# Patient Record
Sex: FEMALE | Race: WHITE | NOT HISPANIC OR LATINO | URBAN - METROPOLITAN AREA
[De-identification: names, ages, dates, MRNs, and addresses within clinical notes are randomized per-mention and may not be internally consistent; named-entity substitution may affect disease eponyms.]

---

## 2017-03-31 ENCOUNTER — OUTPATIENT (OUTPATIENT)
Dept: OUTPATIENT SERVICES | Facility: HOSPITAL | Age: 37
LOS: 1 days | End: 2017-03-31
Payer: MEDICAID

## 2017-03-31 DIAGNOSIS — O26.899 OTHER SPECIFIED PREGNANCY RELATED CONDITIONS, UNSPECIFIED TRIMESTER: ICD-10-CM

## 2017-03-31 DIAGNOSIS — Z3A.00 WEEKS OF GESTATION OF PREGNANCY NOT SPECIFIED: ICD-10-CM

## 2017-03-31 LAB
APPEARANCE UR: SIGNIFICANT CHANGE UP
BILIRUB UR-MCNC: NEGATIVE — SIGNIFICANT CHANGE UP
BLOOD UR QL VISUAL: NEGATIVE — SIGNIFICANT CHANGE UP
COLOR SPEC: SIGNIFICANT CHANGE UP
GLUCOSE UR-MCNC: NEGATIVE — SIGNIFICANT CHANGE UP
KETONES UR-MCNC: SIGNIFICANT CHANGE UP
LEUKOCYTE ESTERASE UR-ACNC: NEGATIVE — SIGNIFICANT CHANGE UP
MUCOUS THREADS # UR AUTO: SIGNIFICANT CHANGE UP
NITRITE UR-MCNC: NEGATIVE — SIGNIFICANT CHANGE UP
PH UR: 6.5 — SIGNIFICANT CHANGE UP (ref 4.6–8)
PROT UR-MCNC: 30 — HIGH
RBC CASTS # UR COMP ASSIST: SIGNIFICANT CHANGE UP (ref 0–?)
SP GR SPEC: 1.01 — SIGNIFICANT CHANGE UP (ref 1–1.03)
SQUAMOUS # UR AUTO: SIGNIFICANT CHANGE UP
UROBILINOGEN FLD QL: NORMAL E.U. — SIGNIFICANT CHANGE UP (ref 0.1–0.2)

## 2017-03-31 PROCEDURE — 76815 OB US LIMITED FETUS(S): CPT | Mod: 26

## 2017-03-31 PROCEDURE — 76817 TRANSVAGINAL US OBSTETRIC: CPT | Mod: 26

## 2017-03-31 PROCEDURE — 99213 OFFICE O/P EST LOW 20 MIN: CPT | Mod: 25

## 2017-03-31 RX ORDER — PROGESTERONE 200 MG/1
1 CAPSULE, LIQUID FILLED ORAL
Qty: 7 | Refills: 0 | OUTPATIENT
Start: 2017-03-31 | End: 2017-04-07

## 2017-04-02 RX ORDER — PROGESTERONE 200 MG/1
1 CAPSULE, LIQUID FILLED ORAL
Qty: 7 | Refills: 0 | OUTPATIENT
Start: 2017-04-02 | End: 2017-04-09

## 2017-04-02 RX ORDER — PROGESTERONE 200 MG/1
1 CAPSULE, LIQUID FILLED ORAL
Qty: 30 | Refills: 0 | OUTPATIENT
Start: 2017-04-02 | End: 2017-05-02

## 2017-04-03 ENCOUNTER — INPATIENT (INPATIENT)
Facility: HOSPITAL | Age: 37
LOS: 0 days | Discharge: HOME | End: 2017-04-04
Attending: OBSTETRICS & GYNECOLOGY | Admitting: OBSTETRICS & GYNECOLOGY
Payer: MEDICAID

## 2017-04-03 VITALS — WEIGHT: 185.19 LBS | HEIGHT: 66 IN

## 2017-04-03 DIAGNOSIS — Z3A.00 WEEKS OF GESTATION OF PREGNANCY NOT SPECIFIED: ICD-10-CM

## 2017-04-03 DIAGNOSIS — O26.899 OTHER SPECIFIED PREGNANCY RELATED CONDITIONS, UNSPECIFIED TRIMESTER: ICD-10-CM

## 2017-04-03 LAB
APPEARANCE UR: CLEAR — SIGNIFICANT CHANGE UP
BACTERIA # UR AUTO: SIGNIFICANT CHANGE UP
BILIRUB UR-MCNC: NEGATIVE — SIGNIFICANT CHANGE UP
BLD GP AB SCN SERPL QL: NEGATIVE — SIGNIFICANT CHANGE UP
BLOOD UR QL VISUAL: NEGATIVE — SIGNIFICANT CHANGE UP
COLOR SPEC: SIGNIFICANT CHANGE UP
GLUCOSE UR-MCNC: 50 — SIGNIFICANT CHANGE UP
HCT VFR BLD CALC: 37.7 % — SIGNIFICANT CHANGE UP (ref 34.5–45)
HGB BLD-MCNC: 12.9 G/DL — SIGNIFICANT CHANGE UP (ref 11.5–15.5)
HYALINE CASTS # UR AUTO: SIGNIFICANT CHANGE UP (ref 0–?)
KETONES UR-MCNC: NEGATIVE — SIGNIFICANT CHANGE UP
LEUKOCYTE ESTERASE UR-ACNC: NEGATIVE — SIGNIFICANT CHANGE UP
MCHC RBC-ENTMCNC: 32.7 PG — SIGNIFICANT CHANGE UP (ref 27–34)
MCHC RBC-ENTMCNC: 34.2 % — SIGNIFICANT CHANGE UP (ref 32–36)
MCV RBC AUTO: 95.7 FL — SIGNIFICANT CHANGE UP (ref 80–100)
MUCOUS THREADS # UR AUTO: SIGNIFICANT CHANGE UP
NITRITE UR-MCNC: NEGATIVE — SIGNIFICANT CHANGE UP
NON-SQ EPI CELLS # UR AUTO: <1 — SIGNIFICANT CHANGE UP
PH UR: 6.5 — SIGNIFICANT CHANGE UP (ref 4.6–8)
PLATELET # BLD AUTO: 322 K/UL — SIGNIFICANT CHANGE UP (ref 150–400)
PMV BLD: 9.9 FL — SIGNIFICANT CHANGE UP (ref 7–13)
PROT UR-MCNC: NEGATIVE — SIGNIFICANT CHANGE UP
RBC # BLD: 3.94 M/UL — SIGNIFICANT CHANGE UP (ref 3.8–5.2)
RBC # FLD: 13.3 % — SIGNIFICANT CHANGE UP (ref 10.3–14.5)
RH IG SCN BLD-IMP: POSITIVE — SIGNIFICANT CHANGE UP
RH IG SCN BLD-IMP: POSITIVE — SIGNIFICANT CHANGE UP
SP GR SPEC: 1.01 — SIGNIFICANT CHANGE UP (ref 1–1.03)
SQUAMOUS # UR AUTO: SIGNIFICANT CHANGE UP
UROBILINOGEN FLD QL: NORMAL E.U. — SIGNIFICANT CHANGE UP (ref 0.1–0.2)
WBC # BLD: 8.83 K/UL — SIGNIFICANT CHANGE UP (ref 3.8–10.5)
WBC # FLD AUTO: 8.83 K/UL — SIGNIFICANT CHANGE UP (ref 3.8–10.5)
WBC UR QL: SIGNIFICANT CHANGE UP (ref 0–?)

## 2017-04-03 RX ORDER — DIPHENHYDRAMINE HCL 50 MG
25 CAPSULE ORAL ONCE
Qty: 0 | Refills: 0 | Status: COMPLETED | OUTPATIENT
Start: 2017-04-03 | End: 2017-04-03

## 2017-04-03 RX ORDER — SODIUM CHLORIDE 9 MG/ML
1000 INJECTION, SOLUTION INTRAVENOUS
Qty: 0 | Refills: 0 | Status: DISCONTINUED | OUTPATIENT
Start: 2017-04-03 | End: 2017-04-04

## 2017-04-03 RX ADMIN — Medication 25 MILLIGRAM(S): at 22:58

## 2017-04-04 ENCOUNTER — ASOB RESULT (OUTPATIENT)
Age: 37
End: 2017-04-04

## 2017-04-04 ENCOUNTER — OUTPATIENT (OUTPATIENT)
Dept: OUTPATIENT SERVICES | Facility: HOSPITAL | Age: 37
LOS: 1 days | End: 2017-04-04
Payer: MEDICAID

## 2017-04-04 ENCOUNTER — APPOINTMENT (OUTPATIENT)
Dept: ANTEPARTUM | Facility: HOSPITAL | Age: 37
End: 2017-04-04

## 2017-04-04 VITALS
HEART RATE: 89 BPM | SYSTOLIC BLOOD PRESSURE: 108 MMHG | RESPIRATION RATE: 15 BRPM | DIASTOLIC BLOOD PRESSURE: 59 MMHG | TEMPERATURE: 98 F | OXYGEN SATURATION: 100 %

## 2017-04-04 DIAGNOSIS — O34.30 MATERNAL CARE FOR CERVICAL INCOMPETENCE, UNSPECIFIED TRIMESTER: ICD-10-CM

## 2017-04-04 DIAGNOSIS — O26.899 OTHER SPECIFIED PREGNANCY RELATED CONDITIONS, UNSPECIFIED TRIMESTER: ICD-10-CM

## 2017-04-04 PROBLEM — Z00.00 ENCOUNTER FOR PREVENTIVE HEALTH EXAMINATION: Status: ACTIVE | Noted: 2017-04-04

## 2017-04-04 LAB
BODY FLUID TYPE: SIGNIFICANT CHANGE UP
CLARITY SPEC: CLEAR — SIGNIFICANT CHANGE UP
COLOR FLD: SIGNIFICANT CHANGE UP
CRYSTALS FLD MICRO: SIGNIFICANT CHANGE UP
GLUCOSE FLD-MCNC: 32 MG/DL — SIGNIFICANT CHANGE UP
GRAM STN FLD: SIGNIFICANT CHANGE UP
GRAM STN FLD: SIGNIFICANT CHANGE UP
LYMPHOCYTES NFR FLD: 40 % — SIGNIFICANT CHANGE UP
RCV VOL RI: 18 CELL/UL — HIGH (ref 0–5)
SPECIMEN SOURCE: SIGNIFICANT CHANGE UP
SPECIMEN SOURCE: SIGNIFICANT CHANGE UP
TOTAL CELLS COUNTED, BODY FLUID: 50 CELLS — SIGNIFICANT CHANGE UP
TOTAL NUCLEATED CELL COUNT, BODY FLUID: 6 CELL/UL — HIGH (ref 0–5)

## 2017-04-04 PROCEDURE — 59320 REVISION OF CERVIX: CPT | Mod: GC

## 2017-04-04 PROCEDURE — 88291 CYTO/MOLECULAR REPORT: CPT

## 2017-04-04 RX ORDER — METOCLOPRAMIDE HCL 10 MG
10 TABLET ORAL ONCE
Qty: 0 | Refills: 0 | Status: COMPLETED | OUTPATIENT
Start: 2017-04-04 | End: 2017-04-04

## 2017-04-04 RX ORDER — SIMETHICONE 80 MG/1
80 TABLET, CHEWABLE ORAL EVERY 4 HOURS
Qty: 0 | Refills: 0 | Status: DISCONTINUED | OUTPATIENT
Start: 2017-04-04 | End: 2017-04-04

## 2017-04-04 RX ORDER — DIPHENHYDRAMINE HCL 50 MG
25 CAPSULE ORAL EVERY 6 HOURS
Qty: 0 | Refills: 0 | Status: DISCONTINUED | OUTPATIENT
Start: 2017-04-04 | End: 2017-04-04

## 2017-04-04 RX ORDER — INDOMETHACIN 50 MG
25 CAPSULE ORAL ONCE
Qty: 0 | Refills: 0 | Status: COMPLETED | OUTPATIENT
Start: 2017-04-04 | End: 2017-04-04

## 2017-04-04 RX ORDER — FERROUS SULFATE 325(65) MG
325 TABLET ORAL DAILY
Qty: 0 | Refills: 0 | Status: DISCONTINUED | OUTPATIENT
Start: 2017-04-04 | End: 2017-04-04

## 2017-04-04 RX ORDER — GLYCERIN ADULT
1 SUPPOSITORY, RECTAL RECTAL AT BEDTIME
Qty: 0 | Refills: 0 | Status: DISCONTINUED | OUTPATIENT
Start: 2017-04-04 | End: 2017-04-04

## 2017-04-04 RX ORDER — FAMOTIDINE 10 MG/ML
20 INJECTION INTRAVENOUS ONCE
Qty: 0 | Refills: 0 | Status: COMPLETED | OUTPATIENT
Start: 2017-04-04 | End: 2017-04-04

## 2017-04-04 RX ORDER — TETANUS TOXOID, REDUCED DIPHTHERIA TOXOID AND ACELLULAR PERTUSSIS VACCINE, ADSORBED 5; 2.5; 8; 8; 2.5 [IU]/.5ML; [IU]/.5ML; UG/.5ML; UG/.5ML; UG/.5ML
0.5 SUSPENSION INTRAMUSCULAR ONCE
Qty: 0 | Refills: 0 | Status: DISCONTINUED | OUTPATIENT
Start: 2017-04-04 | End: 2017-04-04

## 2017-04-04 RX ORDER — DOCUSATE SODIUM 100 MG
100 CAPSULE ORAL
Qty: 0 | Refills: 0 | Status: DISCONTINUED | OUTPATIENT
Start: 2017-04-04 | End: 2017-04-04

## 2017-04-04 RX ORDER — ACETAMINOPHEN 500 MG
975 TABLET ORAL EVERY 6 HOURS
Qty: 0 | Refills: 0 | Status: DISCONTINUED | OUTPATIENT
Start: 2017-04-04 | End: 2017-04-04

## 2017-04-04 RX ORDER — CITRIC ACID/SODIUM CITRATE 300-500 MG
30 SOLUTION, ORAL ORAL ONCE
Qty: 0 | Refills: 0 | Status: COMPLETED | OUTPATIENT
Start: 2017-04-04 | End: 2017-04-04

## 2017-04-04 RX ORDER — INDOMETHACIN 50 MG
50 CAPSULE ORAL ONCE
Qty: 0 | Refills: 0 | Status: COMPLETED | OUTPATIENT
Start: 2017-04-04 | End: 2017-04-04

## 2017-04-04 RX ORDER — INDOMETHACIN 50 MG
1 CAPSULE ORAL
Qty: 8 | Refills: 0 | OUTPATIENT
Start: 2017-04-04 | End: 2017-04-06

## 2017-04-04 RX ORDER — OXYTOCIN 10 UNIT/ML
41.67 VIAL (ML) INJECTION
Qty: 20 | Refills: 0 | Status: DISCONTINUED | OUTPATIENT
Start: 2017-04-04 | End: 2017-04-04

## 2017-04-04 RX ORDER — LANOLIN
1 OINTMENT (GRAM) TOPICAL
Qty: 0 | Refills: 0 | Status: DISCONTINUED | OUTPATIENT
Start: 2017-04-04 | End: 2017-04-04

## 2017-04-04 RX ORDER — SODIUM CHLORIDE 9 MG/ML
1000 INJECTION, SOLUTION INTRAVENOUS
Qty: 0 | Refills: 0 | Status: DISCONTINUED | OUTPATIENT
Start: 2017-04-04 | End: 2017-04-04

## 2017-04-04 RX ADMIN — Medication 25 MILLIGRAM(S): at 19:33

## 2017-04-04 RX ADMIN — Medication 10 MILLIGRAM(S): at 13:07

## 2017-04-04 RX ADMIN — SODIUM CHLORIDE 125 MILLILITER(S): 9 INJECTION, SOLUTION INTRAVENOUS at 10:47

## 2017-04-04 RX ADMIN — FAMOTIDINE 20 MILLIGRAM(S): 10 INJECTION INTRAVENOUS at 13:07

## 2017-04-04 RX ADMIN — Medication 50 MILLIGRAM(S): at 13:08

## 2017-04-04 RX ADMIN — SODIUM CHLORIDE 125 MILLILITER(S): 9 INJECTION, SOLUTION INTRAVENOUS at 02:39

## 2017-04-04 RX ADMIN — Medication 30 MILLILITER(S): at 13:07

## 2017-04-04 NOTE — DISCHARGE NOTE ANTEPARTUM - PLAN OF CARE
recover resume regular diet, pelvic rest. limit activities. continue indocin x24hrs. follow up w/ dr. french in office this week.

## 2017-04-04 NOTE — DISCHARGE NOTE ANTEPARTUM - PATIENT PORTAL LINK FT
“You can access the FollowHealth Patient Portal, offered by [facility], by registering with the following website: [email or web address]”

## 2017-04-04 NOTE — DISCHARGE NOTE ANTEPARTUM - MEDICATION SUMMARY - MEDICATIONS TO TAKE
I will START or STAY ON the medications listed below when I get home from the hospital:    indomethacin 25 mg oral capsule  -- 1 cap(s) by mouth every 6 hours MDD:4  -- Do not take aspirin or aspirin containing products without knowledge and consent of your physician.  It is very important that you take or use this exactly as directed.  Do not skip doses or discontinue unless directed by your doctor.  May cause drowsiness or dizziness.  Obtain medical advice before taking any non-prescription drugs as some may affect the action of this medication.  Take with food or milk.    -- Indication: For contractions I will START or STAY ON the medications listed below when I get home from the hospital:    indomethacin 25 mg oral capsule  -- 1 cap(s) by mouth every 6 hours MDD:4  -- Do not take aspirin or aspirin containing products without knowledge and consent of your physician.  It is very important that you take or use this exactly as directed.  Do not skip doses or discontinue unless directed by your doctor.  May cause drowsiness or dizziness.  Obtain medical advice before taking any non-prescription drugs as some may affect the action of this medication.  Take with food or milk.    -- Indication: For pain

## 2017-04-04 NOTE — DISCHARGE NOTE ANTEPARTUM - CARE PLAN
Principal Discharge DX:	Cervical insufficiency during pregnancy in second trimester, antepartum  Goal:	recover  Instructions for follow-up, activity and diet:	resume regular diet, pelvic rest. limit activities. continue indocin x24hrs. follow up w/ dr. french in office this week.

## 2017-04-04 NOTE — DISCHARGE NOTE ANTEPARTUM - CARE PROVIDER_API CALL
Lisette Slade), Gynecology Obstetrics  Gynecology Obstetrics and Gynecology  [address]  [place] [address]  Phone: [phone]  Fax: [phone]

## 2017-04-04 NOTE — PROVIDER CONTACT NOTE (OTHER) - RECOMMENDATIONS
Review necessity of Heparin administration <12 hours prior to scheduled  with MD.    Utilize venodynes while pt in bed.

## 2017-04-04 NOTE — DISCHARGE NOTE ANTEPARTUM - HOSPITAL COURSE
36  @19w3d presents with 1cm open cervix with membranes funneling. Pt had an amniocentesis,which was negative for infection. pt underwent a rescue cerclage. pt was discharged hd#2 in stable condition.

## 2017-04-04 NOTE — PROVIDER CONTACT NOTE (OTHER) - SITUATION
Pt is 34 weeks on 17  pt is G 2 P 1001 due to deliver twins on 17 by scheduled    at 0730.  Pt due for Heparin 5,000 units at 2200 on 4/3/17. Pt is 34 weeks on 17  pt is G 2 P 1001 due to deliver twins on 17 by scheduled    at 0730.  Pt due for Heparin 5,000 units SQ at 2200 on 4/3/17.

## 2017-04-05 ENCOUNTER — TRANSCRIPTION ENCOUNTER (OUTPATIENT)
Age: 37
End: 2017-04-05

## 2017-04-05 DIAGNOSIS — Z3A.19 19 WEEKS GESTATION OF PREGNANCY: ICD-10-CM

## 2017-04-05 DIAGNOSIS — O26.872 CERVICAL SHORTENING, SECOND TRIMESTER: ICD-10-CM

## 2017-04-05 DIAGNOSIS — O09.522 SUPERVISION OF ELDERLY MULTIGRAVIDA, SECOND TRIMESTER: ICD-10-CM

## 2017-04-05 DIAGNOSIS — Z36 ENCOUNTER FOR ANTENATAL SCREENING OF MOTHER: ICD-10-CM

## 2017-04-05 LAB
BACTERIA UR CULT: SIGNIFICANT CHANGE UP
SPECIMEN SOURCE: SIGNIFICANT CHANGE UP

## 2017-04-06 LAB — GP B STREP GENITAL QL CULT: SIGNIFICANT CHANGE UP

## 2017-04-09 LAB
BACTERIA FLD CULT: SIGNIFICANT CHANGE UP
BACTERIA WND CULT: SIGNIFICANT CHANGE UP

## 2017-04-12 LAB — CHROM ANALY OVERALL INTERP SPEC-IMP: SIGNIFICANT CHANGE UP

## 2017-06-19 ENCOUNTER — TRANSCRIPTION ENCOUNTER (OUTPATIENT)
Age: 37
End: 2017-06-19

## 2017-06-19 ENCOUNTER — RESULT REVIEW (OUTPATIENT)
Age: 37
End: 2017-06-19

## 2017-06-19 ENCOUNTER — INPATIENT (INPATIENT)
Facility: HOSPITAL | Age: 37
LOS: 2 days | Discharge: HOME | End: 2017-06-22
Attending: OBSTETRICS & GYNECOLOGY | Admitting: OBSTETRICS & GYNECOLOGY
Payer: MEDICAID

## 2017-06-19 VITALS — HEIGHT: 66 IN | WEIGHT: 194.01 LBS

## 2017-06-19 DIAGNOSIS — O26.899 OTHER SPECIFIED PREGNANCY RELATED CONDITIONS, UNSPECIFIED TRIMESTER: ICD-10-CM

## 2017-06-19 DIAGNOSIS — Z3A.00 WEEKS OF GESTATION OF PREGNANCY NOT SPECIFIED: ICD-10-CM

## 2017-06-19 LAB
BASOPHILS # BLD AUTO: 0.02 K/UL — SIGNIFICANT CHANGE UP (ref 0–0.2)
BASOPHILS NFR BLD AUTO: 0.2 % — SIGNIFICANT CHANGE UP (ref 0–2)
BLD GP AB SCN SERPL QL: NEGATIVE — SIGNIFICANT CHANGE UP
EOSINOPHIL # BLD AUTO: 0.16 K/UL — SIGNIFICANT CHANGE UP (ref 0–0.5)
EOSINOPHIL NFR BLD AUTO: 1.6 % — SIGNIFICANT CHANGE UP (ref 0–6)
HCT VFR BLD CALC: 37.5 % — SIGNIFICANT CHANGE UP (ref 34.5–45)
HGB BLD-MCNC: 12.3 G/DL — SIGNIFICANT CHANGE UP (ref 11.5–15.5)
IMM GRANULOCYTES NFR BLD AUTO: 0.7 % — SIGNIFICANT CHANGE UP (ref 0–1.5)
LYMPHOCYTES # BLD AUTO: 1.62 K/UL — SIGNIFICANT CHANGE UP (ref 1–3.3)
LYMPHOCYTES # BLD AUTO: 16.2 % — SIGNIFICANT CHANGE UP (ref 13–44)
MCHC RBC-ENTMCNC: 31.1 PG — SIGNIFICANT CHANGE UP (ref 27–34)
MCHC RBC-ENTMCNC: 32.8 % — SIGNIFICANT CHANGE UP (ref 32–36)
MCV RBC AUTO: 94.9 FL — SIGNIFICANT CHANGE UP (ref 80–100)
MONOCYTES # BLD AUTO: 0.72 K/UL — SIGNIFICANT CHANGE UP (ref 0–0.9)
MONOCYTES NFR BLD AUTO: 7.2 % — SIGNIFICANT CHANGE UP (ref 2–14)
NEUTROPHILS # BLD AUTO: 7.43 K/UL — HIGH (ref 1.8–7.4)
NEUTROPHILS NFR BLD AUTO: 74.1 % — SIGNIFICANT CHANGE UP (ref 43–77)
PLATELET # BLD AUTO: 400 K/UL — SIGNIFICANT CHANGE UP (ref 150–400)
PMV BLD: 9.8 FL — SIGNIFICANT CHANGE UP (ref 7–13)
RBC # BLD: 3.95 M/UL — SIGNIFICANT CHANGE UP (ref 3.8–5.2)
RBC # FLD: 13 % — SIGNIFICANT CHANGE UP (ref 10.3–14.5)
RH IG SCN BLD-IMP: POSITIVE — SIGNIFICANT CHANGE UP
WBC # BLD: 10.02 K/UL — SIGNIFICANT CHANGE UP (ref 3.8–10.5)
WBC # FLD AUTO: 10.02 K/UL — SIGNIFICANT CHANGE UP (ref 3.8–10.5)

## 2017-06-19 PROCEDURE — 88307 TISSUE EXAM BY PATHOLOGIST: CPT | Mod: 26

## 2017-06-19 PROCEDURE — 88312 SPECIAL STAINS GROUP 1: CPT | Mod: 26

## 2017-06-19 RX ORDER — MAGNESIUM SULFATE 500 MG/ML
2.5 VIAL (ML) INJECTION
Qty: 40 | Refills: 0 | Status: DISCONTINUED | OUTPATIENT
Start: 2017-06-19 | End: 2017-06-19

## 2017-06-19 RX ORDER — CITRIC ACID/SODIUM CITRATE 300-500 MG
30 SOLUTION, ORAL ORAL ONCE
Qty: 0 | Refills: 0 | Status: COMPLETED | OUTPATIENT
Start: 2017-06-19 | End: 2017-06-19

## 2017-06-19 RX ORDER — MAGNESIUM SULFATE 500 MG/ML
2 VIAL (ML) INJECTION
Qty: 40 | Refills: 0 | Status: DISCONTINUED | OUTPATIENT
Start: 2017-06-19 | End: 2017-06-19

## 2017-06-19 RX ORDER — SODIUM CHLORIDE 9 MG/ML
1000 INJECTION, SOLUTION INTRAVENOUS
Qty: 0 | Refills: 0 | Status: DISCONTINUED | OUTPATIENT
Start: 2017-06-20 | End: 2017-06-22

## 2017-06-19 RX ORDER — AMPICILLIN TRIHYDRATE 250 MG
CAPSULE ORAL EVERY 6 HOURS
Qty: 0 | Refills: 0 | Status: DISCONTINUED | OUTPATIENT
Start: 2017-06-19 | End: 2017-06-19

## 2017-06-19 RX ORDER — SODIUM CHLORIDE 9 MG/ML
1000 INJECTION, SOLUTION INTRAVENOUS
Qty: 0 | Refills: 0 | Status: DISCONTINUED | OUTPATIENT
Start: 2017-06-19 | End: 2017-06-20

## 2017-06-19 RX ORDER — DIPHENHYDRAMINE HCL 50 MG
25 CAPSULE ORAL EVERY 6 HOURS
Qty: 0 | Refills: 0 | Status: DISCONTINUED | OUTPATIENT
Start: 2017-06-20 | End: 2017-06-22

## 2017-06-19 RX ORDER — OXYCODONE HYDROCHLORIDE 5 MG/1
5 TABLET ORAL EVERY 4 HOURS
Qty: 0 | Refills: 0 | Status: DISCONTINUED | OUTPATIENT
Start: 2017-06-20 | End: 2017-06-22

## 2017-06-19 RX ORDER — FERROUS SULFATE 325(65) MG
325 TABLET ORAL DAILY
Qty: 0 | Refills: 0 | Status: DISCONTINUED | OUTPATIENT
Start: 2017-06-20 | End: 2017-06-22

## 2017-06-19 RX ORDER — MAGNESIUM SULFATE 500 MG/ML
4 VIAL (ML) INJECTION ONCE
Qty: 0 | Refills: 0 | Status: COMPLETED | OUTPATIENT
Start: 2017-06-19 | End: 2017-06-19

## 2017-06-19 RX ORDER — ERYTHROMYCIN ETHYLSUCCINATE 400 MG
250 TABLET ORAL EVERY 8 HOURS
Qty: 0 | Refills: 0 | Status: CANCELLED | OUTPATIENT
Start: 2017-06-21 | End: 2017-06-19

## 2017-06-19 RX ORDER — IBUPROFEN 200 MG
600 TABLET ORAL EVERY 6 HOURS
Qty: 0 | Refills: 0 | Status: COMPLETED | OUTPATIENT
Start: 2017-06-20 | End: 2018-05-19

## 2017-06-19 RX ORDER — ONDANSETRON 8 MG/1
4 TABLET, FILM COATED ORAL EVERY 6 HOURS
Qty: 0 | Refills: 0 | Status: DISCONTINUED | OUTPATIENT
Start: 2017-06-20 | End: 2017-06-22

## 2017-06-19 RX ORDER — AMPICILLIN TRIHYDRATE 250 MG
2 CAPSULE ORAL EVERY 6 HOURS
Qty: 0 | Refills: 0 | Status: CANCELLED | OUTPATIENT
Start: 2017-06-20 | End: 2017-06-19

## 2017-06-19 RX ORDER — OXYTOCIN 10 UNIT/ML
41.67 VIAL (ML) INJECTION
Qty: 20 | Refills: 0 | Status: DISCONTINUED | OUTPATIENT
Start: 2017-06-20 | End: 2017-06-22

## 2017-06-19 RX ORDER — ERYTHROMYCIN ETHYLSUCCINATE 400 MG
TABLET ORAL EVERY 6 HOURS
Qty: 0 | Refills: 0 | Status: DISCONTINUED | OUTPATIENT
Start: 2017-06-19 | End: 2017-06-19

## 2017-06-19 RX ORDER — AMOXICILLIN 250 MG/5ML
500 SUSPENSION, RECONSTITUTED, ORAL (ML) ORAL EVERY 8 HOURS
Qty: 0 | Refills: 0 | Status: CANCELLED | OUTPATIENT
Start: 2017-06-21 | End: 2017-06-19

## 2017-06-19 RX ORDER — OXYCODONE HYDROCHLORIDE 5 MG/1
5 TABLET ORAL
Qty: 0 | Refills: 0 | Status: DISCONTINUED | OUTPATIENT
Start: 2017-06-20 | End: 2017-06-22

## 2017-06-19 RX ORDER — SODIUM CHLORIDE 9 MG/ML
1000 INJECTION, SOLUTION INTRAVENOUS
Qty: 0 | Refills: 0 | Status: DISCONTINUED | OUTPATIENT
Start: 2017-06-19 | End: 2017-06-19

## 2017-06-19 RX ORDER — KETOROLAC TROMETHAMINE 30 MG/ML
30 SYRINGE (ML) INJECTION EVERY 6 HOURS
Qty: 0 | Refills: 0 | Status: DISCONTINUED | OUTPATIENT
Start: 2017-06-20 | End: 2017-06-22

## 2017-06-19 RX ORDER — AMPICILLIN TRIHYDRATE 250 MG
2 CAPSULE ORAL ONCE
Qty: 0 | Refills: 0 | Status: COMPLETED | OUTPATIENT
Start: 2017-06-19 | End: 2017-06-19

## 2017-06-19 RX ORDER — MORPHINE SULFATE 50 MG/1
0.2 CAPSULE, EXTENDED RELEASE ORAL ONCE
Qty: 0 | Refills: 0 | Status: DISCONTINUED | OUTPATIENT
Start: 2017-06-20 | End: 2017-06-22

## 2017-06-19 RX ORDER — TETANUS TOXOID, REDUCED DIPHTHERIA TOXOID AND ACELLULAR PERTUSSIS VACCINE, ADSORBED 5; 2.5; 8; 8; 2.5 [IU]/.5ML; [IU]/.5ML; UG/.5ML; UG/.5ML; UG/.5ML
0.5 SUSPENSION INTRAMUSCULAR ONCE
Qty: 0 | Refills: 0 | Status: DISCONTINUED | OUTPATIENT
Start: 2017-06-20 | End: 2017-06-22

## 2017-06-19 RX ORDER — ERYTHROMYCIN ETHYLSUCCINATE 400 MG
250 TABLET ORAL EVERY 6 HOURS
Qty: 0 | Refills: 0 | Status: CANCELLED | OUTPATIENT
Start: 2017-06-20 | End: 2017-06-19

## 2017-06-19 RX ORDER — SIMETHICONE 80 MG/1
80 TABLET, CHEWABLE ORAL EVERY 4 HOURS
Qty: 0 | Refills: 0 | Status: DISCONTINUED | OUTPATIENT
Start: 2017-06-20 | End: 2017-06-22

## 2017-06-19 RX ORDER — NALOXONE HYDROCHLORIDE 4 MG/.1ML
0.1 SPRAY NASAL
Qty: 0 | Refills: 0 | Status: DISCONTINUED | OUTPATIENT
Start: 2017-06-20 | End: 2017-06-22

## 2017-06-19 RX ORDER — LANOLIN
1 OINTMENT (GRAM) TOPICAL
Qty: 0 | Refills: 0 | Status: DISCONTINUED | OUTPATIENT
Start: 2017-06-20 | End: 2017-06-22

## 2017-06-19 RX ORDER — MORPHINE SULFATE 50 MG/1
2 CAPSULE, EXTENDED RELEASE ORAL ONCE
Qty: 0 | Refills: 0 | Status: DISCONTINUED | OUTPATIENT
Start: 2017-06-19 | End: 2017-06-19

## 2017-06-19 RX ORDER — ERYTHROMYCIN ETHYLSUCCINATE 400 MG
250 TABLET ORAL ONCE
Qty: 0 | Refills: 0 | Status: COMPLETED | OUTPATIENT
Start: 2017-06-19 | End: 2017-06-19

## 2017-06-19 RX ORDER — METOCLOPRAMIDE HCL 10 MG
10 TABLET ORAL ONCE
Qty: 0 | Refills: 0 | Status: COMPLETED | OUTPATIENT
Start: 2017-06-19 | End: 2017-06-19

## 2017-06-19 RX ORDER — ACETAMINOPHEN 500 MG
975 TABLET ORAL EVERY 6 HOURS
Qty: 0 | Refills: 0 | Status: COMPLETED | OUTPATIENT
Start: 2017-06-20 | End: 2018-05-19

## 2017-06-19 RX ORDER — OXYCODONE HYDROCHLORIDE 5 MG/1
10 TABLET ORAL
Qty: 0 | Refills: 0 | Status: DISCONTINUED | OUTPATIENT
Start: 2017-06-20 | End: 2017-06-22

## 2017-06-19 RX ORDER — GLYCERIN ADULT
1 SUPPOSITORY, RECTAL RECTAL AT BEDTIME
Qty: 0 | Refills: 0 | Status: DISCONTINUED | OUTPATIENT
Start: 2017-06-20 | End: 2017-06-22

## 2017-06-19 RX ORDER — HYDROMORPHONE HYDROCHLORIDE 2 MG/ML
1 INJECTION INTRAMUSCULAR; INTRAVENOUS; SUBCUTANEOUS
Qty: 0 | Refills: 0 | Status: DISCONTINUED | OUTPATIENT
Start: 2017-06-20 | End: 2017-06-20

## 2017-06-19 RX ORDER — DOCUSATE SODIUM 100 MG
100 CAPSULE ORAL
Qty: 0 | Refills: 0 | Status: DISCONTINUED | OUTPATIENT
Start: 2017-06-20 | End: 2017-06-22

## 2017-06-19 RX ORDER — FAMOTIDINE 10 MG/ML
20 INJECTION INTRAVENOUS ONCE
Qty: 0 | Refills: 0 | Status: COMPLETED | OUTPATIENT
Start: 2017-06-19 | End: 2017-06-19

## 2017-06-19 RX ORDER — OXYTOCIN 10 UNIT/ML
333.33 VIAL (ML) INJECTION
Qty: 20 | Refills: 0 | Status: DISCONTINUED | OUTPATIENT
Start: 2017-06-19 | End: 2017-06-22

## 2017-06-19 RX ORDER — OXYTOCIN 10 UNIT/ML
41.67 VIAL (ML) INJECTION
Qty: 20 | Refills: 0 | Status: DISCONTINUED | OUTPATIENT
Start: 2017-06-19 | End: 2017-06-22

## 2017-06-19 RX ADMIN — Medication 300 GRAM(S): at 16:57

## 2017-06-19 RX ADMIN — MORPHINE SULFATE 2 MILLIGRAM(S): 50 CAPSULE, EXTENDED RELEASE ORAL at 19:30

## 2017-06-19 RX ADMIN — Medication 12 MILLIGRAM(S): at 17:08

## 2017-06-19 RX ADMIN — MORPHINE SULFATE 2 MILLIGRAM(S): 50 CAPSULE, EXTENDED RELEASE ORAL at 19:07

## 2017-06-19 RX ADMIN — Medication 216 GRAM(S): at 16:59

## 2017-06-19 RX ADMIN — Medication 10 MILLIGRAM(S): at 20:37

## 2017-06-19 RX ADMIN — Medication 30 MILLILITER(S): at 20:38

## 2017-06-19 RX ADMIN — Medication 200 MILLIGRAM(S): at 17:38

## 2017-06-19 RX ADMIN — Medication 62.5 GM/HR: at 20:59

## 2017-06-19 RX ADMIN — FAMOTIDINE 20 MILLIGRAM(S): 10 INJECTION INTRAVENOUS at 20:37

## 2017-06-19 RX ADMIN — SODIUM CHLORIDE 125 MILLILITER(S): 9 INJECTION, SOLUTION INTRAVENOUS at 18:48

## 2017-06-19 RX ADMIN — Medication 125 MILLIUNIT(S)/MIN: at 22:25

## 2017-06-19 RX ADMIN — Medication 50 GM/HR: at 17:18

## 2017-06-19 NOTE — DISCHARGE NOTE OB - CARE PLAN
Principal Discharge DX:	 delivery delivered  Goal:	pregnancy delivered  Instructions for follow-up, activity and diet:	diet and activity as tolerated

## 2017-06-19 NOTE — DISCHARGE NOTE OB - MATERIALS PROVIDED
Birth Certificate Instructions/Tdap Vaccination (VIS Pub Date: January 24, 2012)/Shaken Baby Prevention Handout

## 2017-06-19 NOTE — PATIENT PROFILE OB - COPING STRESSORS, PROFILE
relationship concerns/separation/divorce/caregiver responsibilities/Father of the baby not involved/family problems

## 2017-06-19 NOTE — DISCHARGE NOTE OB - HOSPITAL COURSE
35 yo G2 now P1 sp PLTCS and cerclage removal at 30.5wga after presenting with PPROM and PTL.  Male infant apgars 8/9, 1590grams.

## 2017-06-19 NOTE — DISCHARGE NOTE OB - CARE PROVIDER_API CALL
Alvina Hernandez), Obstetrics and Gynecology  [facility] [place] [address]  Phone: [phone]  Fax: [phone]

## 2017-06-19 NOTE — DISCHARGE NOTE OB - PATIENT PORTAL LINK FT
“You can access the FollowHealth Patient Portal, offered by [facility], by registering with the following website: [email or web address]”

## 2017-06-19 NOTE — DISCHARGE NOTE OB - MEDICATION SUMMARY - MEDICATIONS TO STOP TAKING
I will STOP taking the medications listed below when I get home from the hospital:    progesterone 200 mg vaginal suppository  -- 1 suppository(ies) vaginally once a day (at bedtime)

## 2017-06-20 LAB
BASOPHILS # BLD AUTO: 0.01 K/UL — SIGNIFICANT CHANGE UP (ref 0–0.2)
BASOPHILS NFR BLD AUTO: 0.1 % — SIGNIFICANT CHANGE UP (ref 0–2)
EOSINOPHIL # BLD AUTO: 0 K/UL — SIGNIFICANT CHANGE UP (ref 0–0.5)
EOSINOPHIL NFR BLD AUTO: 0 % — SIGNIFICANT CHANGE UP (ref 0–6)
HCT VFR BLD CALC: 36.3 % — SIGNIFICANT CHANGE UP (ref 34.5–45)
HGB BLD-MCNC: 12.2 G/DL — SIGNIFICANT CHANGE UP (ref 11.5–15.5)
IMM GRANULOCYTES NFR BLD AUTO: 0.5 % — SIGNIFICANT CHANGE UP (ref 0–1.5)
LYMPHOCYTES # BLD AUTO: 1.44 K/UL — SIGNIFICANT CHANGE UP (ref 1–3.3)
LYMPHOCYTES # BLD AUTO: 7.8 % — LOW (ref 13–44)
MCHC RBC-ENTMCNC: 31.9 PG — SIGNIFICANT CHANGE UP (ref 27–34)
MCHC RBC-ENTMCNC: 33.6 % — SIGNIFICANT CHANGE UP (ref 32–36)
MCV RBC AUTO: 94.8 FL — SIGNIFICANT CHANGE UP (ref 80–100)
MONOCYTES # BLD AUTO: 0.91 K/UL — HIGH (ref 0–0.9)
MONOCYTES NFR BLD AUTO: 4.9 % — SIGNIFICANT CHANGE UP (ref 2–14)
NEUTROPHILS # BLD AUTO: 15.98 K/UL — HIGH (ref 1.8–7.4)
NEUTROPHILS NFR BLD AUTO: 86.7 % — HIGH (ref 43–77)
PLATELET # BLD AUTO: 362 K/UL — SIGNIFICANT CHANGE UP (ref 150–400)
PMV BLD: 9.8 FL — SIGNIFICANT CHANGE UP (ref 7–13)
RBC # BLD: 3.83 M/UL — SIGNIFICANT CHANGE UP (ref 3.8–5.2)
RBC # FLD: 13 % — SIGNIFICANT CHANGE UP (ref 10.3–14.5)
T PALLIDUM AB TITR SER: NEGATIVE — SIGNIFICANT CHANGE UP
WBC # BLD: 18.44 K/UL — HIGH (ref 3.8–10.5)
WBC # FLD AUTO: 18.44 K/UL — HIGH (ref 3.8–10.5)

## 2017-06-20 RX ORDER — ACETAMINOPHEN 500 MG
975 TABLET ORAL EVERY 6 HOURS
Qty: 0 | Refills: 0 | Status: DISCONTINUED | OUTPATIENT
Start: 2017-06-20 | End: 2017-06-22

## 2017-06-20 RX ORDER — IBUPROFEN 200 MG
600 TABLET ORAL EVERY 6 HOURS
Qty: 0 | Refills: 0 | Status: DISCONTINUED | OUTPATIENT
Start: 2017-06-20 | End: 2017-06-22

## 2017-06-20 RX ORDER — HEPARIN SODIUM 5000 [USP'U]/ML
5000 INJECTION INTRAVENOUS; SUBCUTANEOUS EVERY 12 HOURS
Qty: 0 | Refills: 0 | Status: DISCONTINUED | OUTPATIENT
Start: 2017-06-20 | End: 2017-06-22

## 2017-06-20 RX ADMIN — Medication 975 MILLIGRAM(S): at 09:50

## 2017-06-20 RX ADMIN — Medication 975 MILLIGRAM(S): at 17:50

## 2017-06-20 RX ADMIN — Medication 325 MILLIGRAM(S): at 17:51

## 2017-06-20 RX ADMIN — SODIUM CHLORIDE 125 MILLILITER(S): 9 INJECTION, SOLUTION INTRAVENOUS at 06:00

## 2017-06-20 RX ADMIN — Medication 600 MILLIGRAM(S): at 09:00

## 2017-06-20 RX ADMIN — Medication 600 MILLIGRAM(S): at 18:23

## 2017-06-20 RX ADMIN — HEPARIN SODIUM 5000 UNIT(S): 5000 INJECTION INTRAVENOUS; SUBCUTANEOUS at 04:41

## 2017-06-20 RX ADMIN — Medication 600 MILLIGRAM(S): at 09:50

## 2017-06-20 RX ADMIN — Medication 1 TABLET(S): at 17:51

## 2017-06-20 RX ADMIN — Medication 975 MILLIGRAM(S): at 18:23

## 2017-06-20 RX ADMIN — HEPARIN SODIUM 5000 UNIT(S): 5000 INJECTION INTRAVENOUS; SUBCUTANEOUS at 18:28

## 2017-06-20 RX ADMIN — Medication 600 MILLIGRAM(S): at 17:31

## 2017-06-20 RX ADMIN — Medication 100 MILLIGRAM(S): at 18:28

## 2017-06-20 RX ADMIN — Medication 975 MILLIGRAM(S): at 09:00

## 2017-06-20 NOTE — PROGRESS NOTE ADULT - SUBJECTIVE AND OBJECTIVE BOX
POST OP DAY  1  s/p   SECTION    SUBJECTIVE: "I feel good."    PAIN SCALE SCORE: [x] Refer to charted pain scores    THERAPY:  [x ] Spinal morphine   [  ] Epidural morphine   [  ] IV PCA Hydromorphone 1 mg/ml    OBJECTIVE:     SEDATION SCORE:	  [ x ] Alert	    [  ] Drowsy        [  ] Arousable	[  ] Asleep	[  ] Unresponsive    Side Effects:	  [ x ] None	     [  ] Nausea        [  ] Pruritus        [  ] Weakness   [  ] Numbness        ASSESSMENT/ PLAN   [   ] Discontinue         [  ] Continue    [ x ] Change to prn Analgesics as per primary service.    DOCUMENTATION & VERIFICATION OF CURRENT MEDS [ x ] Done    COMMENTS: No Headache.

## 2017-06-20 NOTE — PROGRESS NOTE ADULT - SUBJECTIVE AND OBJECTIVE BOX
ANESTHESIA POSTOP CHECK    37y Female POSTOP DAY 1 s/p     No COMPLAINTS    NO APPARENT ANESTHESIA COMPLICATIONS

## 2017-06-20 NOTE — PROGRESS NOTE ADULT - SUBJECTIVE AND OBJECTIVE BOX
OB Progress Note: Primary  Delivery, POD#1    S: 36yo POD#1 s/p pLTCS for Breech @30weeks w/PPROM +PTL . Her pain is well controlled. She is tolerating a regular diet and passing flatus. Denies N/V. Denies CP/SOB/lightheadedness/dizziness. Indwelling catheter is in place vs. Indwelling catheter was removed this AM- patient is due to void.     O:   Vital Signs Last 24 Hrs  T(C): 36.8, Max: 36.8 (-20 @ 06:17)  T(F): 98.2, Max: 98.2 (-20 @ 06:17)  HR: 78 (74 - 85)  BP: 100/54 (100/54 - 121/66)  BP(mean): 74 (67 - 96)  RR: 19 (14 - 21)  SpO2: 96% (96% - 100%)    Labs:  Blood type: B Positive  Rubella IgG: RPR: Negative                          12.2   18.44<H> >-----------< 362    ( - @ 04:45 )             36.3                        12.3   10.02 >-----------< 400    ( - @ 18:00 )             37.5                  PE:  General: NAD  Abdomen: Mildly distended, appropriately tender, incision c/d/i.  Extremities: No erythema, no pitting edema    A/P: 36yo G  P POD#1 s/p pLTCS for Breech, ptl, PROM@30weeks.  - Continue regular diet.  - Increase ambulation.  - Continue motrin, tylenol, oxycodone PRN for pain control.  - Discontinue daniels catheter at 24 hours post-op   - F/u AM CBC    Partha Pethanh PGY-2

## 2017-06-21 LAB — SPECIMEN SOURCE: SIGNIFICANT CHANGE UP

## 2017-06-21 RX ADMIN — Medication 975 MILLIGRAM(S): at 19:06

## 2017-06-21 RX ADMIN — Medication 25 MILLIGRAM(S): at 00:08

## 2017-06-21 RX ADMIN — Medication 600 MILLIGRAM(S): at 18:05

## 2017-06-21 RX ADMIN — Medication 975 MILLIGRAM(S): at 00:08

## 2017-06-21 RX ADMIN — Medication 100 MILLIGRAM(S): at 00:08

## 2017-06-21 RX ADMIN — HEPARIN SODIUM 5000 UNIT(S): 5000 INJECTION INTRAVENOUS; SUBCUTANEOUS at 23:15

## 2017-06-21 RX ADMIN — Medication 975 MILLIGRAM(S): at 18:05

## 2017-06-21 RX ADMIN — Medication 600 MILLIGRAM(S): at 00:50

## 2017-06-21 RX ADMIN — HEPARIN SODIUM 5000 UNIT(S): 5000 INJECTION INTRAVENOUS; SUBCUTANEOUS at 10:46

## 2017-06-21 RX ADMIN — Medication 1 TABLET(S): at 14:41

## 2017-06-21 RX ADMIN — Medication 600 MILLIGRAM(S): at 19:06

## 2017-06-21 RX ADMIN — Medication 975 MILLIGRAM(S): at 00:50

## 2017-06-21 RX ADMIN — Medication 325 MILLIGRAM(S): at 14:40

## 2017-06-21 RX ADMIN — Medication 975 MILLIGRAM(S): at 10:47

## 2017-06-21 RX ADMIN — Medication 600 MILLIGRAM(S): at 00:08

## 2017-06-21 RX ADMIN — Medication 600 MILLIGRAM(S): at 10:46

## 2017-06-21 NOTE — PROGRESS NOTE ADULT - SUBJECTIVE AND OBJECTIVE BOX
OB Progress Note: pTLCS, POD#2    S: 38yo POD#2 s/p pLTCS. Her pain is well controlled. She is tolerating a regular diet and passing flatus. Voiding spontaneously. Denies N/V. Denies CP/SOB/lightheadedness/dizziness.     O:  Vitals:  Vital Signs Last 24 Hrs  T(C): 36.3, Max: 37 (06-20 @ 22:41)  T(F): 97.3, Max: 98.6 (06-20 @ 22:41)  HR: 64 (64 - 78)  BP: 111/71 (96/51 - 117/69)  BP(mean): --  RR: 18 (16 - 18)  SpO2: 99% (97% - 100%)    MEDICATIONS  (STANDING):  morphine PF Spinal 0.2milliGRAM(s) IntraThecal. once  oxytocin Infusion 333.333milliUNIT(s)/Min IV Continuous <Continuous>  oxytocin Infusion 41.667milliUNIT(s)/Min IV Continuous <Continuous>  lactated ringers. 1000milliLiter(s) IV Continuous <Continuous>  diphtheria/tetanus/pertussis (acellular) Vaccine (ADAcel) 0.5milliLiter(s) IntraMuscular once  oxytocin Infusion 41.667milliUNIT(s)/Min IV Continuous <Continuous>  ferrous    sulfate 325milliGRAM(s) Oral daily  prenatal multivitamin 1Tablet(s) Oral daily  ketorolac   Injectable 30milliGRAM(s) IV Push every 6 hours  oxyCODONE IR 5milliGRAM(s) Oral every 3 hours  heparin  Injectable 5000Unit(s) SubCutaneous every 12 hours  acetaminophen   Tablet. 975milliGRAM(s) Oral every 6 hours  ibuprofen  Tablet 600milliGRAM(s) Oral every 6 hours      MEDICATIONS  (PRN):  oxyCODONE IR 5milliGRAM(s) Oral every 3 hours PRN Mild Pain  oxyCODONE IR 10milliGRAM(s) Oral every 3 hours PRN Moderate Pain  HYDROmorphone  Injectable 1milliGRAM(s) IV Push every 3 hours PRN Moderate Pain (4 - 6)  naloxone Injectable 0.1milliGRAM(s) IV Push every 3 minutes PRN For ANY of the following changes in patient status:  A. RR LESS THAN 10 breaths per minute, B. Oxygen saturation LESS THAN 90%, C. Sedation score of 6  ondansetron Injectable 4milliGRAM(s) IV Push every 6 hours PRN Nausea  simethicone 80milliGRAM(s) Chew every 4 hours PRN Gas  diphenhydrAMINE   Capsule 25milliGRAM(s) Oral every 6 hours PRN Itching  glycerin Suppository - Adult 1Suppository(s) Rectal at bedtime PRN Constipation  docusate sodium 100milliGRAM(s) Oral two times a day PRN Stool Softening  lanolin Ointment 1Application(s) Topical every 3 hours PRN Sore Nipples  oxyCODONE IR 5milliGRAM(s) Oral every 4 hours PRN Severe Pain (7 - 10)      Labs:  Blood type: B Positive  Rubella IgG: RPR: Negative                          12.2   18.44<H> >-----------< 362    ( 06-20 @ 04:45 )             36.3                        12.3   10.02 >-----------< 400    ( 06-19 @ 18:00 )             37.5                  PE:  General: NAD  Abdomen: Soft, appropriately tender, incision c/d/i.  Extremities: No erythema, no pitting edema

## 2017-06-21 NOTE — PROGRESS NOTE ADULT - PROBLEM SELECTOR PLAN 1
- Continue regular diet.  - Increase ambulation.  - Continue motrin, tylenol, oxycodone PRN for pain control.    Partha Garsia PGY-2

## 2017-06-22 VITALS
SYSTOLIC BLOOD PRESSURE: 125 MMHG | RESPIRATION RATE: 18 BRPM | TEMPERATURE: 99 F | DIASTOLIC BLOOD PRESSURE: 81 MMHG | OXYGEN SATURATION: 99 % | HEART RATE: 72 BPM

## 2017-06-22 LAB — GP B STREP GENITAL QL CULT: SIGNIFICANT CHANGE UP

## 2017-06-22 RX ORDER — IBUPROFEN 200 MG
1 TABLET ORAL
Qty: 20 | Refills: 0 | OUTPATIENT
Start: 2017-06-22 | End: 2017-06-27

## 2017-06-22 RX ORDER — PROGESTERONE 200 MG/1
1 CAPSULE, LIQUID FILLED ORAL
Qty: 0 | Refills: 0 | COMMUNITY

## 2017-06-22 RX ADMIN — Medication 975 MILLIGRAM(S): at 08:24

## 2017-06-22 RX ADMIN — Medication 600 MILLIGRAM(S): at 01:21

## 2017-06-22 RX ADMIN — Medication 600 MILLIGRAM(S): at 08:24

## 2017-06-22 RX ADMIN — Medication 975 MILLIGRAM(S): at 01:21

## 2017-06-22 RX ADMIN — Medication 975 MILLIGRAM(S): at 09:00

## 2017-06-22 RX ADMIN — Medication 600 MILLIGRAM(S): at 15:26

## 2017-06-22 RX ADMIN — Medication 600 MILLIGRAM(S): at 02:21

## 2017-06-22 RX ADMIN — Medication 975 MILLIGRAM(S): at 14:44

## 2017-06-22 RX ADMIN — Medication 975 MILLIGRAM(S): at 15:26

## 2017-06-22 RX ADMIN — Medication 1 TABLET(S): at 14:43

## 2017-06-22 RX ADMIN — Medication 600 MILLIGRAM(S): at 09:00

## 2017-06-22 RX ADMIN — Medication 600 MILLIGRAM(S): at 14:44

## 2017-06-22 RX ADMIN — Medication 975 MILLIGRAM(S): at 02:21

## 2017-06-22 RX ADMIN — Medication 325 MILLIGRAM(S): at 14:43

## 2017-06-22 NOTE — DISCHARGE NOTE ADULT - CARE PROVIDER_API CALL
Lisette Slade), Gynecology Obstetrics  Gynecology Obstetrics and Gynecology  [address]  [place] [address]  Phone: [phone]  Fax: [phone]

## 2017-06-22 NOTE — DISCHARGE NOTE ADULT - MEDICATION SUMMARY - MEDICATIONS TO TAKE
I will START or STAY ON the medications listed below when I get home from the hospital:    ibuprofen 600 mg oral tablet  -- 1 tab(s) by mouth every 6 hours  -- Do not take this drug if you are pregnant.  It is very important that you take or use this exactly as directed.  Do not skip doses or discontinue unless directed by your doctor.  May cause drowsiness or dizziness.  Obtain medical advice before taking any non-prescription drugs as some may affect the action of this medication.  Take with food or milk.    -- Indication: For Pain

## 2017-06-22 NOTE — DISCHARGE NOTE ADULT - PATIENT PORTAL LINK FT
“You can access the FollowHealth Patient Portal, offered by [facility], by registering with the following website: [email or web address]”

## 2017-06-22 NOTE — PROGRESS NOTE ADULT - SUBJECTIVE AND OBJECTIVE BOX
Patient seen and examined at bedside, no acute overnight events. No acute complaints, pain well controlled.  Patient is ambulating, voiding spontaneously, passing flatus, and tolerating regular diet.   Vital Signs Last 24 Hours  T(C): 36.8, Max: 36.8 (06-22 @ 05:29)  HR: 61 (61 - 73)  BP: 137/86 (113/70 - 137/86)  RR: 20 (17 - 20)  SpO2: 100% (96% - 100%)  Wt(kg): --    Physical Exam:  General: NAD  Abdomen: Soft, non-tender, non-distended, fundus firm  Incision: Pfannenstiel incision CDI, subcuticular suture closure  Pelvic: Lochia wnl    Labs:    Blood Type: B Positive  RPR: Negative               12.2   18.44 )-----------( 362      ( 06-20 @ 04:45 )             36.3                12.3   10.02 )-----------( 400      ( 06-19 @ 18:00 )             37.5         MEDICATIONS  (STANDING):  morphine PF Spinal 0.2milliGRAM(s) IntraThecal. once  oxytocin Infusion 333.333milliUNIT(s)/Min IV Continuous <Continuous>  oxytocin Infusion 41.667milliUNIT(s)/Min IV Continuous <Continuous>  lactated ringers. 1000milliLiter(s) IV Continuous <Continuous>  diphtheria/tetanus/pertussis (acellular) Vaccine (ADAcel) 0.5milliLiter(s) IntraMuscular once  oxytocin Infusion 41.667milliUNIT(s)/Min IV Continuous <Continuous>  ferrous    sulfate 325milliGRAM(s) Oral daily  prenatal multivitamin 1Tablet(s) Oral daily  ketorolac   Injectable 30milliGRAM(s) IV Push every 6 hours  oxyCODONE IR 5milliGRAM(s) Oral every 3 hours  heparin  Injectable 5000Unit(s) SubCutaneous every 12 hours  acetaminophen   Tablet. 975milliGRAM(s) Oral every 6 hours  ibuprofen  Tablet 600milliGRAM(s) Oral every 6 hours    MEDICATIONS  (PRN):  oxyCODONE IR 5milliGRAM(s) Oral every 3 hours PRN Mild Pain  oxyCODONE IR 10milliGRAM(s) Oral every 3 hours PRN Moderate Pain  naloxone Injectable 0.1milliGRAM(s) IV Push every 3 minutes PRN For ANY of the following changes in patient status:  A. RR LESS THAN 10 breaths per minute, B. Oxygen saturation LESS THAN 90%, C. Sedation score of 6  ondansetron Injectable 4milliGRAM(s) IV Push every 6 hours PRN Nausea  simethicone 80milliGRAM(s) Chew every 4 hours PRN Gas  diphenhydrAMINE   Capsule 25milliGRAM(s) Oral every 6 hours PRN Itching  glycerin Suppository - Adult 1Suppository(s) Rectal at bedtime PRN Constipation  docusate sodium 100milliGRAM(s) Oral two times a day PRN Stool Softening  lanolin Ointment 1Application(s) Topical every 3 hours PRN Sore Nipples  oxyCODONE IR 5milliGRAM(s) Oral every 4 hours PRN Severe Pain (7 - 10)

## 2017-06-22 NOTE — PROGRESS NOTE ADULT - PROBLEM SELECTOR PLAN 1
- Continue with po analgesia  - Increase ambulation  - Continue regular diet  - IV lock  - No labs  -d/c planning

## 2017-06-22 NOTE — DISCHARGE NOTE ADULT - CARE PLAN
Principal Discharge DX:	 delivery delivered  Goal:	recovery  Instructions for follow-up, activity and diet:	Regular diet.  Resume normal activity as tolerated. Follow up at [facility] in 6 weeks.  No heavy lifting, driving, or strenuous activity for 6 weeks.  Nothing per vagina such as tampons, intercourse, douches or tub baths for 6 weeks or until you see your doctor.  Call your doctor with any signs and symptoms of infection such as fever, chills, nausea or vomiting.  Call your doctor if you're unable to tolerate food, increase in vaginal bleeding or have difficulty urinating.  Call your doctor if you have pain that is not relieved by your prescribed medications.  Notify your doctor with any other concerns.

## 2017-12-22 ENCOUNTER — RESULT REVIEW (OUTPATIENT)
Age: 37
End: 2017-12-22

## 2018-11-21 NOTE — PROVIDER CONTACT NOTE (OTHER) - ASSESSMENT
Pt informed her post dated script for pain meds was process to her chosen pharmacy. All questions appear to be addressed at this time.     Patient without complain of cramping at this time

## 2020-03-14 NOTE — PATIENT PROFILE OB - BABY SUPPLIES, OB PROFILE
Advanced Heart Failure Therapies Focus Note    Chart reviewed including labs, vitals and provider notes.   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  Will defer continued volume management to nephrology in the setting of CKD IV; currently on lasix infusion + daily metolazone.     Discussed with Dr. Steve ELAINE to sign off at this time. Please call with any questions or concerns.     Araceli JAMA   Pager [phone]  On-call physician available [phone]      car seat

## 2025-02-26 ENCOUNTER — INPATIENT (INPATIENT)
Facility: HOSPITAL | Age: 45
LOS: 0 days | Discharge: HOME | DRG: 446 | End: 2025-02-27
Attending: STUDENT IN AN ORGANIZED HEALTH CARE EDUCATION/TRAINING PROGRAM | Admitting: STUDENT IN AN ORGANIZED HEALTH CARE EDUCATION/TRAINING PROGRAM
Payer: MEDICAID

## 2025-02-26 VITALS
DIASTOLIC BLOOD PRESSURE: 84 MMHG | TEMPERATURE: 99 F | RESPIRATION RATE: 18 BRPM | HEART RATE: 85 BPM | HEIGHT: 66 IN | OXYGEN SATURATION: 96 % | WEIGHT: 162.92 LBS | SYSTOLIC BLOOD PRESSURE: 127 MMHG

## 2025-02-26 DIAGNOSIS — K81.0 ACUTE CHOLECYSTITIS: ICD-10-CM

## 2025-02-26 LAB
ALBUMIN SERPL ELPH-MCNC: 4 G/DL — SIGNIFICANT CHANGE UP (ref 3.3–5)
ALP SERPL-CCNC: 63 U/L — SIGNIFICANT CHANGE UP (ref 40–120)
ALT FLD-CCNC: 16 U/L — SIGNIFICANT CHANGE UP (ref 10–45)
ANION GAP SERPL CALC-SCNC: 11 MMOL/L — SIGNIFICANT CHANGE UP (ref 5–17)
APPEARANCE UR: ABNORMAL
AST SERPL-CCNC: 14 U/L — SIGNIFICANT CHANGE UP (ref 10–40)
BACTERIA # UR AUTO: NEGATIVE /HPF — SIGNIFICANT CHANGE UP
BASOPHILS # BLD AUTO: 0.04 K/UL — SIGNIFICANT CHANGE UP (ref 0–0.2)
BASOPHILS NFR BLD AUTO: 0.4 % — SIGNIFICANT CHANGE UP (ref 0–2)
BILIRUB SERPL-MCNC: 0.3 MG/DL — SIGNIFICANT CHANGE UP (ref 0.2–1.2)
BILIRUB UR-MCNC: NEGATIVE — SIGNIFICANT CHANGE UP
BUN SERPL-MCNC: 19 MG/DL — SIGNIFICANT CHANGE UP (ref 7–23)
CALCIUM SERPL-MCNC: 9 MG/DL — SIGNIFICANT CHANGE UP (ref 8.4–10.5)
CAST: 2 /LPF — SIGNIFICANT CHANGE UP (ref 0–4)
CHLORIDE SERPL-SCNC: 105 MMOL/L — SIGNIFICANT CHANGE UP (ref 96–108)
CO2 SERPL-SCNC: 22 MMOL/L — SIGNIFICANT CHANGE UP (ref 22–31)
COLOR SPEC: YELLOW — SIGNIFICANT CHANGE UP
CREAT SERPL-MCNC: 0.77 MG/DL — SIGNIFICANT CHANGE UP (ref 0.5–1.3)
DIFF PNL FLD: ABNORMAL
EGFR: 97 ML/MIN/1.73M2 — SIGNIFICANT CHANGE UP
EOSINOPHIL # BLD AUTO: 0.32 K/UL — SIGNIFICANT CHANGE UP (ref 0–0.5)
EOSINOPHIL NFR BLD AUTO: 3.6 % — SIGNIFICANT CHANGE UP (ref 0–6)
GAS PNL BLDV: SIGNIFICANT CHANGE UP
GLUCOSE SERPL-MCNC: 78 MG/DL — SIGNIFICANT CHANGE UP (ref 70–99)
GLUCOSE UR QL: NEGATIVE MG/DL — SIGNIFICANT CHANGE UP
HCG SERPL-ACNC: <2 MIU/ML — SIGNIFICANT CHANGE UP
HCT VFR BLD CALC: 41.4 % — SIGNIFICANT CHANGE UP (ref 34.5–45)
HGB BLD-MCNC: 13.6 G/DL — SIGNIFICANT CHANGE UP (ref 11.5–15.5)
IMM GRANULOCYTES NFR BLD AUTO: 0.3 % — SIGNIFICANT CHANGE UP (ref 0–0.9)
KETONES UR-MCNC: NEGATIVE MG/DL — SIGNIFICANT CHANGE UP
LEUKOCYTE ESTERASE UR-ACNC: NEGATIVE — SIGNIFICANT CHANGE UP
LIDOCAIN IGE QN: 50 U/L — SIGNIFICANT CHANGE UP (ref 7–60)
LYMPHOCYTES # BLD AUTO: 2.16 K/UL — SIGNIFICANT CHANGE UP (ref 1–3.3)
LYMPHOCYTES # BLD AUTO: 24.3 % — SIGNIFICANT CHANGE UP (ref 13–44)
MCHC RBC-ENTMCNC: 30.6 PG — SIGNIFICANT CHANGE UP (ref 27–34)
MCHC RBC-ENTMCNC: 32.9 G/DL — SIGNIFICANT CHANGE UP (ref 32–36)
MCV RBC AUTO: 93.2 FL — SIGNIFICANT CHANGE UP (ref 80–100)
MONOCYTES # BLD AUTO: 0.82 K/UL — SIGNIFICANT CHANGE UP (ref 0–0.9)
MONOCYTES NFR BLD AUTO: 9.2 % — SIGNIFICANT CHANGE UP (ref 2–14)
NEUTROPHILS # BLD AUTO: 5.52 K/UL — SIGNIFICANT CHANGE UP (ref 1.8–7.4)
NEUTROPHILS NFR BLD AUTO: 62.2 % — SIGNIFICANT CHANGE UP (ref 43–77)
NITRITE UR-MCNC: NEGATIVE — SIGNIFICANT CHANGE UP
NRBC BLD AUTO-RTO: 0 /100 WBCS — SIGNIFICANT CHANGE UP (ref 0–0)
PH UR: 5.5 — SIGNIFICANT CHANGE UP (ref 5–8)
PLATELET # BLD AUTO: 316 K/UL — SIGNIFICANT CHANGE UP (ref 150–400)
POTASSIUM SERPL-MCNC: 3.8 MMOL/L — SIGNIFICANT CHANGE UP (ref 3.5–5.3)
POTASSIUM SERPL-SCNC: 3.8 MMOL/L — SIGNIFICANT CHANGE UP (ref 3.5–5.3)
PROT SERPL-MCNC: 7.4 G/DL — SIGNIFICANT CHANGE UP (ref 6–8.3)
PROT UR-MCNC: SIGNIFICANT CHANGE UP MG/DL
RBC # BLD: 4.44 M/UL — SIGNIFICANT CHANGE UP (ref 3.8–5.2)
RBC # FLD: 13.2 % — SIGNIFICANT CHANGE UP (ref 10.3–14.5)
RBC CASTS # UR COMP ASSIST: 4 /HPF — SIGNIFICANT CHANGE UP (ref 0–4)
REVIEW: SIGNIFICANT CHANGE UP
SODIUM SERPL-SCNC: 138 MMOL/L — SIGNIFICANT CHANGE UP (ref 135–145)
SP GR SPEC: >1.03 — HIGH (ref 1–1.03)
SQUAMOUS # UR AUTO: 16 /HPF — HIGH (ref 0–5)
UROBILINOGEN FLD QL: 1 MG/DL — SIGNIFICANT CHANGE UP (ref 0.2–1)
WBC # BLD: 8.89 K/UL — SIGNIFICANT CHANGE UP (ref 3.8–10.5)
WBC # FLD AUTO: 8.89 K/UL — SIGNIFICANT CHANGE UP (ref 3.8–10.5)
WBC UR QL: 5 /HPF — SIGNIFICANT CHANGE UP (ref 0–5)

## 2025-02-26 PROCEDURE — 76705 ECHO EXAM OF ABDOMEN: CPT | Mod: 26

## 2025-02-26 PROCEDURE — 99222 1ST HOSP IP/OBS MODERATE 55: CPT | Mod: 57

## 2025-02-26 PROCEDURE — 99285 EMERGENCY DEPT VISIT HI MDM: CPT

## 2025-02-26 RX ORDER — PIPERACILLIN-TAZO-DEXTROSE,ISO 3.375G/5
3.38 IV SOLUTION, PIGGYBACK PREMIX FROZEN(ML) INTRAVENOUS EVERY 8 HOURS
Refills: 0 | Status: DISCONTINUED | OUTPATIENT
Start: 2025-02-27 | End: 2025-02-27

## 2025-02-26 RX ORDER — ONDANSETRON HCL/PF 4 MG/2 ML
4 VIAL (ML) INJECTION EVERY 6 HOURS
Refills: 0 | Status: DISCONTINUED | OUTPATIENT
Start: 2025-02-26 | End: 2025-02-27

## 2025-02-26 RX ORDER — ACETAMINOPHEN 500 MG/5ML
1000 LIQUID (ML) ORAL EVERY 6 HOURS
Refills: 0 | Status: DISCONTINUED | OUTPATIENT
Start: 2025-02-26 | End: 2025-02-27

## 2025-02-26 RX ORDER — ACETAMINOPHEN 500 MG/5ML
1000 LIQUID (ML) ORAL ONCE
Refills: 0 | Status: COMPLETED | OUTPATIENT
Start: 2025-02-26 | End: 2025-02-26

## 2025-02-26 RX ORDER — PIPERACILLIN-TAZO-DEXTROSE,ISO 3.375G/5
3.38 IV SOLUTION, PIGGYBACK PREMIX FROZEN(ML) INTRAVENOUS ONCE
Refills: 0 | Status: COMPLETED | OUTPATIENT
Start: 2025-02-26 | End: 2025-02-26

## 2025-02-26 RX ORDER — INFLUENZA A VIRUS A/IDAHO/07/2018 (H1N1) ANTIGEN (MDCK CELL DERIVED, PROPIOLACTONE INACTIVATED, INFLUENZA A VIRUS A/INDIANA/08/2018 (H3N2) ANTIGEN (MDCK CELL DERIVED, PROPIOLACTONE INACTIVATED), INFLUENZA B VIRUS B/SINGAPORE/INFTT-16-0610/2016 ANTIGEN (MDCK CELL DERIVED, PROPIOLACTONE INACTIVATED), INFLUENZA B VIRUS B/IOWA/06/2017 ANTIGEN (MDCK CELL DERIVED, PROPIOLACTONE INACTIVATED) 15; 15; 15; 15 UG/.5ML; UG/.5ML; UG/.5ML; UG/.5ML
0.5 INJECTION, SUSPENSION INTRAMUSCULAR ONCE
Refills: 0 | Status: DISCONTINUED | OUTPATIENT
Start: 2025-02-26 | End: 2025-02-27

## 2025-02-26 RX ORDER — ENOXAPARIN SODIUM 100 MG/ML
40 INJECTION SUBCUTANEOUS EVERY 24 HOURS
Refills: 0 | Status: DISCONTINUED | OUTPATIENT
Start: 2025-02-26 | End: 2025-02-27

## 2025-02-26 RX ORDER — ONDANSETRON HCL/PF 4 MG/2 ML
4 VIAL (ML) INJECTION ONCE
Refills: 0 | Status: COMPLETED | OUTPATIENT
Start: 2025-02-26 | End: 2025-02-26

## 2025-02-26 RX ORDER — SODIUM CHLORIDE 9 G/1000ML
1000 INJECTION, SOLUTION INTRAVENOUS
Refills: 0 | Status: DISCONTINUED | OUTPATIENT
Start: 2025-02-26 | End: 2025-02-27

## 2025-02-26 RX ADMIN — Medication 400 MILLIGRAM(S): at 11:11

## 2025-02-26 RX ADMIN — Medication 25 GRAM(S): at 16:00

## 2025-02-26 RX ADMIN — SODIUM CHLORIDE 75 MILLILITER(S): 9 INJECTION, SOLUTION INTRAVENOUS at 17:03

## 2025-02-26 RX ADMIN — Medication 200 GRAM(S): at 12:08

## 2025-02-26 RX ADMIN — Medication 400 MILLIGRAM(S): at 17:03

## 2025-02-26 RX ADMIN — Medication 25 GRAM(S): at 21:22

## 2025-02-26 RX ADMIN — Medication 1000 MILLIGRAM(S): at 18:00

## 2025-02-26 RX ADMIN — ENOXAPARIN SODIUM 40 MILLIGRAM(S): 100 INJECTION SUBCUTANEOUS at 17:03

## 2025-02-26 RX ADMIN — Medication 4 MILLIGRAM(S): at 11:11

## 2025-02-26 RX ADMIN — Medication 1000 MILLILITER(S): at 11:11

## 2025-02-26 NOTE — ED PROVIDER NOTE - CLINICAL SUMMARY MEDICAL DECISION MAKING FREE TEXT BOX
Case of a 44-year-old female with no significant past medical history, NKDA, no daily meds presents to the ED for 3-day history RUQ pain with associated nauseas and vomiting. Patient endorses this is the 3 episode similar sx have happened and denies any NBNB emesis. At moment of evaluation patient is found with stable VS, in pain primarily in RUQ/epigastric region. ROS as previously described with PE + tenderness to palpation in the RUQ and epigastrium, + Masters sign, tenderness unable to be elicited in any additional quadrants; rest of examination unremarkable. At this time sx most likely due to acute cholecystitis, choledocholelithiasis Case of a 44-year-old female with no significant past medical history, NKDA, no daily meds presents to the ED for 3-day history RUQ pain with associated nauseas and vomiting. Patient endorses this is the 3 episode similar sx have happened and denies any NBNB emesis. At moment of evaluation patient is found with stable VS, in pain primarily in RUQ/epigastric region. ROS as previously described with PE + tenderness to palpation in the RUQ and epigastrium, + Masters sign, tenderness unable to be elicited in any additional quadrants; rest of examination unremarkable. At this time sx most likely due to acute cholecystitis, choledocholithiasis, pancreatitis, atypical chest pain, among others. Will obtain CBC, CMP, and lipase. Will perform PoCUS of the RUQ to evaluate for acute cholecystitis. Will provide fluids, anti-emetics and pain management and will reassess for any changes. Will monitor for any changes. Case of a 44-year-old female with no significant past medical history, NKDA, no daily meds presents to the ED for 3-day history RUQ pain with associated nauseas and vomiting. Patient endorses this is the 3 episode similar sx have happened and denies any NBNB emesis. At moment of evaluation patient is found with stable VS, in pain primarily in RUQ/epigastric region. ROS as previously described with PE + tenderness to palpation in the RUQ and epigastrium, + Masters sign, tenderness unable to be elicited in any additional quadrants; rest of examination unremarkable. At this time sx most likely due to acute cholecystitis, choledocholithiasis, pancreatitis, atypical chest pain, among others. Will obtain CBC, CMP, and lipase. Will perform PoCUS of the RUQ to evaluate for acute cholecystitis. Will provide fluids, anti-emetics and pain management and will reassess for any changes. Will monitor for any changes.  Vijay: 44 year old female with no pmhx here with 3 days of RUQ pain with n/v. worse after eating. PE: att exam: patient awake alert NAD . LUNGS CTAB no wheeze no crackle. CARD RRR no m/r/g.  Abdomen soft ttp ruq, + muprhys sign, no CVA tenderness. EXT WWP no edema no calf tenderness CV 2+DP/PT bilaterally. neuro A&Ox3, no focal deficits, plan: will get labs, ruq u/s, ivf, pain control, ua, r/o acute javier, r/o biliary pathology, reasess.

## 2025-02-26 NOTE — ED ADULT NURSE NOTE - OBJECTIVE STATEMENT
44 yr old female with  from home c/o RUQ/R sided abd pain x 4 days, nausea/vomiting x 2 days, now only pain/nausea, denies urinary c/o, clear lungs, abd sof, +tender RUQ, +bowel sounds, ambulatory, +hx C-sec

## 2025-02-26 NOTE — PATIENT PROFILE ADULT - FALL HARM RISK - UNIVERSAL INTERVENTIONS
Bed in lowest position, wheels locked, appropriate side rails in place/Call bell, personal items and telephone in reach/Instruct patient to call for assistance before getting out of bed or chair/Non-slip footwear when patient is out of bed/Dix to call system/Physically safe environment - no spills, clutter or unnecessary equipment/Purposeful Proactive Rounding/Room/bathroom lighting operational, light cord in reach

## 2025-02-26 NOTE — H&P ADULT - ASSESSMENT
44 F no PMH p/w 3 days of RUQ pain consistent with acute cholecystitis.     Plan:  - admit to ACS surgery, Dr. Barcenas  - added onto OR schedule for tomorrow for laparoscopic cholecystectomy  - pending consent  - CLD, NPO at midnight  - IVF after midnight  - IV zosyn  - Zofran PRN  - Pain control: IV tylenol  - DVT ppx: Lovenox    Discussed w Dr. Barcenas  ACS/Trauma Surgery  [phone]

## 2025-02-26 NOTE — H&P ADULT - NSHPLABSRESULTS_GEN_ALL_CORE
LABS:  cret                        13.6   8.89  )-----------( 316      ( 26 Feb 2025 11:29 )             41.4     02-26    138  |  105  |  19  ----------------------------<  78  3.8   |  22  |  0.77    Ca    9.0      26 Feb 2025 11:29    TPro  7.4  /  Alb  4.0  /  TBili  0.3  /  DBili  x   /  AST  14  /  ALT  16  /  AlkPhos  63  02-26        Albumin: 4.0 g/dL (02-26-25 @ 11:29)      < from: POCUS ED Abdomen Limited (02.26.25 @ 12:55) >    INTERPRETATION:  Grayscale imaging of the right upper quadrant was   performed.  The gallbladder was visualized and scanned in longitudinal and transverse   planes.  The anterior gallbladder wall measured  0.66 cm.  The common bile duct measured 0.46 cm.  Gallstones present.  Sludge not present.  Pericholecystic fluid not present.  Gallbladder wall edema present.  Sonographic Masters's sign present.    IMPRESSION:Cholecystitis    < end of copied text >

## 2025-02-26 NOTE — H&P ADULT - NSHPPHYSICALEXAM_GEN_ALL_CORE
T(C): 37.1 (02-26-25 @ 09:50), Max: 37.1 (02-26-25 @ 09:50)  HR: 85 (02-26-25 @ 09:50) (85 - 85)  BP: 127/84 (02-26-25 @ 09:50) (127/84 - 127/84)  RR: 18 (02-26-25 @ 09:50) (18 - 18)  SpO2: 96% (02-26-25 @ 09:50) (96% - 96%)    CONSTITUTIONAL: Well groomed, no apparent distress  ENMT: Oral mucosa with moist membranes  RESP: No respiratory distress, no use of accessory muscles  CV: RRR  GI: Soft, TTP in RUQ, ND, no rebound, no guarding; no palpable masses; no hepatosplenomegaly; no hernia palpated  MSK: Normal gait; Normal ROM without pain, no spinal tenderness, normal muscle strength/tone  PSYCH: Appropriate insight/judgment; A+O x 3

## 2025-02-26 NOTE — ED ADULT NURSE REASSESSMENT NOTE - NS ED NURSE REASSESS COMMENT FT1
zosyn loading dose infusing, surgery consult pending,  present, +reports improvement in pain and nausea

## 2025-02-26 NOTE — ED PROVIDER NOTE - PHYSICAL EXAMINATION
Const:  Alert and interactive, no acute distress  HEENT: Normocephalic, atraumatic; PERRLA with EOMI; TMs WNL; Moist mucosa; no exudates or petechiae in the palate; Neck supple  CV: RRR with no audible murmurs; Extremities WWPx4 with cap refill < 2 secs  Pulm: Lungs clear to auscultation bilaterally with no crackles or wheezing; symmetric chest rise  GI: Abdomen non-distended; +BS; No organomegaly, + tenderness to palpation in the RUQ and epigastrium, + Masters sign, tenderness unable to be elicited in any additional quadrants.   Skin: No rash noted  Neuro: CN II-X intact with no focal deficits, ambulating without difficulty and no gait disturbances; full ROM in all ext with intact sensation in upper and lower ext.

## 2025-02-26 NOTE — H&P ADULT - ATTENDING COMMENTS
44 year old woman presenting with RUQ/epigastric pain x 3 days after eating, +gallstones on ultrasound with thickened wall, likely early acute cholecystitis. WBC count normal, bilirubin and transaminases normal.     Admit to Trauma.  IV antibiotics.  Plan for laparoscopic, possible open, cholecystectomy.       Sarah Barcenas M.D.  Department of Trauma, Acute Care Surgery and Surgical Critical Care

## 2025-02-26 NOTE — ED PROVIDER NOTE - PROGRESS NOTE DETAILS
Maikel Auguste MD PGY-7: Patient with PoCUS findings consistent with acute cholecystitis. CBC with no elevated WBC or Lt shift. Pending CMP. Will empirically start on Zosyn and consult Sx for further recommendations. Maikel Auguste MD PGY-7: Patient endorses improvement in pain after interventions. CMP showing no electrolyte abnormalities. No elevated LFTs or elevated TB. Consulted Sx for interventions.

## 2025-02-26 NOTE — H&P ADULT - HISTORY OF PRESENT ILLNESS
44 F no PMH p/w 3 days of RUQ pain. Pain associated with food and began after eating a fatty meal. Radiates to epigastrium and described as stabbing in nature. Associated with nausea and a few episodes of NBNB emesis. Has had episodes like this before, approx 3 times, with last being 2 weeks ago.     Denies fevers, chills, CP, SOB, diarrhea, constipation.

## 2025-02-26 NOTE — ED PROVIDER NOTE - OBJECTIVE STATEMENT
Case of a 44-year-old female with no significant past medical history, NKDA, no daily meds presents to the ED for 3-day history RUQ pain.  Patient states that symptoms began after eating a fatty meal with associated onset of RUQ pain migrating to the epigastrium with associated worsening of pain with deep breath as well as lying flat. Rated 8/10 and stabbing in nature. Patient endorses 3-4 vomiting episodes NBNB and pain non-responsive to OTC.  Patient states that the patient was otherwise well until yesterday morning and today she endorses worsening and recurrence of pain in the same region as previously described. Due to ongoing sx, patient presents for evaluation.    Of note, patient states this tis the 3rd time an event like this has happened, last one being 2 wks prior with sx resolution.  Patient denies any fever, headaches, chills, chest pain, SOB, dyspnea on exertion, back pain, diarrhea, constipation, dysuria, hematuria, vaginal discharge, swelling in any extremity, or skin changes.

## 2025-02-26 NOTE — PATIENT PROFILE ADULT - FUNCTIONAL SCREEN CURRENT LEVEL: COMMUNICATION, MLM
PT DAILY TREATMENT NOTE 8    Patient Name: Akila Valadez  Date:3/6/2018  : 1957  [x]  Patient  Verified  Payor: [address] / Plan: [address] / Product Type: Managed [employer] Medicare /    In time:1:02  Out time:2:35  Total Treatment Time (min): 93  Total Timed Codes (min): 77  1:1 Treatment Time (min): 58   Visit #: 14 of 18    Treatment Area: Strain of muscle, fascia and tendon of lower back, initial encounter [L32.264K]  Strain of muscle, fascia and tendon at neck level, initial encounter [S16. 1XXA]  Radiculopathy, lumbar region [M54.16]  Pain in right knee [M25.561]    SUBJECTIVE  Pain Level (0-10 scale): 6/10  Any medication changes, allergies to medications, adverse drug reactions, diagnosis change, or new procedure performed?: [x] No    [] Yes (see summary sheet for update)  Subjective functional status/changes:   [] No changes reported  My neck and arm has been feeling pretty good lately but my leg is still bothering me more than anything lately.     OBJECTIVE  Modality rationale: decrease pain and increase tissue extensibility to improve the patients ability to improve functional abilities   Min Type Additional Details    [] Estim: []Att   []Unatt        []TENS instruct                  []IFC  []Premod   []NMES                     []Other:  []w/US   []w/ice   []w/heat  Position:  Location:    []  Traction: [] Cervical       []Lumbar                       [] Prone          []Supine                       []Intermittent   []Continuous Lbs:  [] before manual  [] after manual    []  Ultrasound: []Continuous   [] Pulsed                           []1MHz   []3MHz Location:  W/cm2:    []  Iontophoresis with dexamethasone         Location: [] Take home patch   [] In clinic   10 []  Ice     [x]  heat  []  Ice massage Position:supine  Location:cervical/lumbar    []  Vasopneumatic Device Pressure:       [] lo [] med [] hi   Temperature: [] lo [] med [] hi   [] Skin assessment post-treatment:  []intact []redness- no adverse reaction       []redness  adverse reaction:       83 min Therapeutic Exercise:  [x] See flow sheet :   Rationale: increase ROM and increase strength to improve the patients ability to improve functional abilities           min Patient Education: [x] Review HEP    [] Progressed/Changed HEP based on:   [] positioning   [] body mechanics   [] transfers   [] heat/ice application        Other Objective/Functional Measures:     Pain Level (0-10 scale) post treatment: 0    ASSESSMENT/Changes in Function: Pt was able to increase from 3 to 4 lbs with scaption PRE's against the wall with min to mod challenge today. Pt still presents with chief c/o increased right LE radicular pain/sxs over the past few txs, but is making slow but steady progress with advancing within current POC. Will continue to progress/advance patient within current POC as tolerated with monitoring symptoms. Patient will continue to benefit from skilled PT services to modify and progress therapeutic interventions, address functional mobility deficits, address ROM deficits, address strength deficits, analyze and address soft tissue restrictions, analyze and cue movement patterns, analyze and modify body mechanics/ergonomics and assess and modify postural abnormalities to attain remaining goals.      []  See Plan of Care  []  See progress note/recertification  []  See Discharge Summary         Progress towards goals / Updated goals:  Goal #2=Able to balance in sharpened Romberg 30 seconds while standing on foam to demonstrate improved dynamic balance and safety:Met with occasional CGA to maintain balance     PLAN  [x]  Upgrade activities as tolerated     []  Continue plan of care  []  Update interventions per flow sheet       []  Discharge due to:_  []  Other:_      Caitlyn Lobo PTA 3/6/2018  1:01 PM 0 = understands/communicates without difficulty

## 2025-02-26 NOTE — ED ADULT NURSE REASSESSMENT NOTE - NS ED NURSE REASSESS COMMENT FT1
T&S sent, +admitted, possibly OR for acute javier today T&S sent, +admitted, possibly OR for acute javier today, NPO maintained,  to secure belongings if pt goes to OR

## 2025-02-27 ENCOUNTER — RESULT REVIEW (OUTPATIENT)
Age: 45
End: 2025-02-27

## 2025-02-27 ENCOUNTER — TRANSCRIPTION ENCOUNTER (OUTPATIENT)
Age: 45
End: 2025-02-27

## 2025-02-27 VITALS
DIASTOLIC BLOOD PRESSURE: 78 MMHG | SYSTOLIC BLOOD PRESSURE: 125 MMHG | OXYGEN SATURATION: 99 % | HEART RATE: 74 BPM | RESPIRATION RATE: 16 BRPM

## 2025-02-27 LAB
ALBUMIN SERPL ELPH-MCNC: 3.3 G/DL — SIGNIFICANT CHANGE UP (ref 3.3–5)
ALP SERPL-CCNC: 55 U/L — SIGNIFICANT CHANGE UP (ref 40–120)
ALT FLD-CCNC: 16 U/L — SIGNIFICANT CHANGE UP (ref 10–45)
ANION GAP SERPL CALC-SCNC: 9 MMOL/L — SIGNIFICANT CHANGE UP (ref 5–17)
APTT BLD: 28.8 SEC — SIGNIFICANT CHANGE UP (ref 24.5–35.6)
AST SERPL-CCNC: 15 U/L — SIGNIFICANT CHANGE UP (ref 10–40)
BILIRUB DIRECT SERPL-MCNC: 0.1 MG/DL — SIGNIFICANT CHANGE UP (ref 0–0.3)
BILIRUB INDIRECT FLD-MCNC: 0.3 MG/DL — SIGNIFICANT CHANGE UP (ref 0.2–1)
BILIRUB SERPL-MCNC: 0.4 MG/DL — SIGNIFICANT CHANGE UP (ref 0.2–1.2)
BLD GP AB SCN SERPL QL: NEGATIVE — SIGNIFICANT CHANGE UP
BUN SERPL-MCNC: 11 MG/DL — SIGNIFICANT CHANGE UP (ref 7–23)
CALCIUM SERPL-MCNC: 8.5 MG/DL — SIGNIFICANT CHANGE UP (ref 8.4–10.5)
CHLORIDE SERPL-SCNC: 109 MMOL/L — HIGH (ref 96–108)
CO2 SERPL-SCNC: 20 MMOL/L — LOW (ref 22–31)
CREAT SERPL-MCNC: 0.76 MG/DL — SIGNIFICANT CHANGE UP (ref 0.5–1.3)
CULTURE RESULTS: SIGNIFICANT CHANGE UP
EGFR: 98 ML/MIN/1.73M2 — SIGNIFICANT CHANGE UP
GLUCOSE SERPL-MCNC: 84 MG/DL — SIGNIFICANT CHANGE UP (ref 70–99)
HCT VFR BLD CALC: 35.8 % — SIGNIFICANT CHANGE UP (ref 34.5–45)
HGB BLD-MCNC: 12.1 G/DL — SIGNIFICANT CHANGE UP (ref 11.5–15.5)
INR BLD: 0.96 RATIO — SIGNIFICANT CHANGE UP (ref 0.85–1.16)
MAGNESIUM SERPL-MCNC: 2.1 MG/DL — SIGNIFICANT CHANGE UP (ref 1.6–2.6)
MCHC RBC-ENTMCNC: 31.7 PG — SIGNIFICANT CHANGE UP (ref 27–34)
MCHC RBC-ENTMCNC: 33.8 G/DL — SIGNIFICANT CHANGE UP (ref 32–36)
MCV RBC AUTO: 93.7 FL — SIGNIFICANT CHANGE UP (ref 80–100)
NRBC BLD AUTO-RTO: 0 /100 WBCS — SIGNIFICANT CHANGE UP (ref 0–0)
PHOSPHATE SERPL-MCNC: 3.8 MG/DL — SIGNIFICANT CHANGE UP (ref 2.5–4.5)
PLATELET # BLD AUTO: 275 K/UL — SIGNIFICANT CHANGE UP (ref 150–400)
POTASSIUM SERPL-MCNC: 3.7 MMOL/L — SIGNIFICANT CHANGE UP (ref 3.5–5.3)
POTASSIUM SERPL-SCNC: 3.7 MMOL/L — SIGNIFICANT CHANGE UP (ref 3.5–5.3)
PROT SERPL-MCNC: 6.1 G/DL — SIGNIFICANT CHANGE UP (ref 6–8.3)
PROTHROM AB SERPL-ACNC: 11 SEC — SIGNIFICANT CHANGE UP (ref 9.9–13.4)
RBC # BLD: 3.82 M/UL — SIGNIFICANT CHANGE UP (ref 3.8–5.2)
RBC # FLD: 13 % — SIGNIFICANT CHANGE UP (ref 10.3–14.5)
RH IG SCN BLD-IMP: POSITIVE — SIGNIFICANT CHANGE UP
SODIUM SERPL-SCNC: 138 MMOL/L — SIGNIFICANT CHANGE UP (ref 135–145)
SPECIMEN SOURCE: SIGNIFICANT CHANGE UP
WBC # BLD: 6.21 K/UL — SIGNIFICANT CHANGE UP (ref 3.8–10.5)
WBC # FLD AUTO: 6.21 K/UL — SIGNIFICANT CHANGE UP (ref 3.8–10.5)

## 2025-02-27 PROCEDURE — 99285 EMERGENCY DEPT VISIT HI MDM: CPT | Mod: 25

## 2025-02-27 PROCEDURE — 82947 ASSAY GLUCOSE BLOOD QUANT: CPT

## 2025-02-27 PROCEDURE — 81001 URINALYSIS AUTO W/SCOPE: CPT

## 2025-02-27 PROCEDURE — 76705 ECHO EXAM OF ABDOMEN: CPT

## 2025-02-27 PROCEDURE — 80076 HEPATIC FUNCTION PANEL: CPT

## 2025-02-27 PROCEDURE — 82330 ASSAY OF CALCIUM: CPT

## 2025-02-27 PROCEDURE — 83735 ASSAY OF MAGNESIUM: CPT

## 2025-02-27 PROCEDURE — 84132 ASSAY OF SERUM POTASSIUM: CPT

## 2025-02-27 PROCEDURE — 84295 ASSAY OF SERUM SODIUM: CPT

## 2025-02-27 PROCEDURE — 86901 BLOOD TYPING SEROLOGIC RH(D): CPT

## 2025-02-27 PROCEDURE — 80053 COMPREHEN METABOLIC PANEL: CPT

## 2025-02-27 PROCEDURE — 96376 TX/PRO/DX INJ SAME DRUG ADON: CPT

## 2025-02-27 PROCEDURE — 85025 COMPLETE CBC W/AUTO DIFF WBC: CPT

## 2025-02-27 PROCEDURE — C1889: CPT

## 2025-02-27 PROCEDURE — 85610 PROTHROMBIN TIME: CPT

## 2025-02-27 PROCEDURE — 96374 THER/PROPH/DIAG INJ IV PUSH: CPT

## 2025-02-27 PROCEDURE — 85027 COMPLETE CBC AUTOMATED: CPT

## 2025-02-27 PROCEDURE — 82435 ASSAY OF BLOOD CHLORIDE: CPT

## 2025-02-27 PROCEDURE — 85730 THROMBOPLASTIN TIME PARTIAL: CPT

## 2025-02-27 PROCEDURE — 87086 URINE CULTURE/COLONY COUNT: CPT

## 2025-02-27 PROCEDURE — 85014 HEMATOCRIT: CPT

## 2025-02-27 PROCEDURE — 85018 HEMOGLOBIN: CPT

## 2025-02-27 PROCEDURE — 86900 BLOOD TYPING SEROLOGIC ABO: CPT

## 2025-02-27 PROCEDURE — 84702 CHORIONIC GONADOTROPIN TEST: CPT

## 2025-02-27 PROCEDURE — 83690 ASSAY OF LIPASE: CPT

## 2025-02-27 PROCEDURE — 84100 ASSAY OF PHOSPHORUS: CPT

## 2025-02-27 PROCEDURE — 36415 COLL VENOUS BLD VENIPUNCTURE: CPT

## 2025-02-27 PROCEDURE — C9399: CPT

## 2025-02-27 PROCEDURE — 80048 BASIC METABOLIC PNL TOTAL CA: CPT

## 2025-02-27 PROCEDURE — 88304 TISSUE EXAM BY PATHOLOGIST: CPT | Mod: 26

## 2025-02-27 PROCEDURE — 47562 LAPAROSCOPIC CHOLECYSTECTOMY: CPT | Mod: GC

## 2025-02-27 PROCEDURE — 88304 TISSUE EXAM BY PATHOLOGIST: CPT

## 2025-02-27 PROCEDURE — 96375 TX/PRO/DX INJ NEW DRUG ADDON: CPT

## 2025-02-27 PROCEDURE — 86850 RBC ANTIBODY SCREEN: CPT

## 2025-02-27 PROCEDURE — 82803 BLOOD GASES ANY COMBINATION: CPT

## 2025-02-27 PROCEDURE — 83605 ASSAY OF LACTIC ACID: CPT

## 2025-02-27 DEVICE — CLIP APPLIER COVIDIEN ENDOCLIP 5MM: Type: IMPLANTABLE DEVICE | Status: FUNCTIONAL

## 2025-02-27 DEVICE — LIGATING CLIPS WECK HEMOLOK POLYMER MEDIUM (BLUE) 6: Type: IMPLANTABLE DEVICE | Status: FUNCTIONAL

## 2025-02-27 DEVICE — VISTASEAL FIBRIN HUMAN 4ML: Type: IMPLANTABLE DEVICE | Status: FUNCTIONAL

## 2025-02-27 DEVICE — LIGATING CLIPS WECK HEMOLOK POLYMER MEDIUM-LARGE (GREEN) 6: Type: IMPLANTABLE DEVICE | Status: FUNCTIONAL

## 2025-02-27 DEVICE — LIGATING CLIPS WECK HEMOLOK POLYMER EXTRA LARGE (GOLD) 6: Type: IMPLANTABLE DEVICE | Status: FUNCTIONAL

## 2025-02-27 DEVICE — SURGICEL 2 X 14": Type: IMPLANTABLE DEVICE | Status: FUNCTIONAL

## 2025-02-27 DEVICE — CHOLANGIOGRAM CATH SYMMETRY REDDICK 4FR X 50CM WITH STRAIGHT INTRODUCER: Type: IMPLANTABLE DEVICE | Status: FUNCTIONAL

## 2025-02-27 DEVICE — SURGIFLO HEMOSTATIC MATRIX KIT: Type: IMPLANTABLE DEVICE | Status: FUNCTIONAL

## 2025-02-27 DEVICE — SURGICEL FIBRILLAR 2 X 4": Type: IMPLANTABLE DEVICE | Status: FUNCTIONAL

## 2025-02-27 DEVICE — CLIP APPLIER COVIDIEN ENDOCLIP III 5MM: Type: IMPLANTABLE DEVICE | Status: FUNCTIONAL

## 2025-02-27 DEVICE — LIGATING CLIPS WECK HEMOLOK POLYMER LARGE (PURPLE) 6: Type: IMPLANTABLE DEVICE | Status: FUNCTIONAL

## 2025-02-27 DEVICE — CLIP APPLIER COVIDIEN ENDOCLIP II 10MM MED/LG: Type: IMPLANTABLE DEVICE | Status: FUNCTIONAL

## 2025-02-27 RX ORDER — OXYCODONE HYDROCHLORIDE 30 MG/1
1 TABLET ORAL
Qty: 10 | Refills: 0
Start: 2025-02-27

## 2025-02-27 RX ORDER — HYDROMORPHONE/SOD CHLOR,ISO/PF 2 MG/10 ML
0.5 SYRINGE (ML) INJECTION
Refills: 0 | Status: DISCONTINUED | OUTPATIENT
Start: 2025-02-27 | End: 2025-02-27

## 2025-02-27 RX ORDER — FENTANYL CITRATE-0.9 % NACL/PF 100MCG/2ML
50 SYRINGE (ML) INTRAVENOUS
Refills: 0 | Status: DISCONTINUED | OUTPATIENT
Start: 2025-02-27 | End: 2025-02-27

## 2025-02-27 RX ORDER — ONDANSETRON HCL/PF 4 MG/2 ML
4 VIAL (ML) INJECTION ONCE
Refills: 0 | Status: COMPLETED | OUTPATIENT
Start: 2025-02-27 | End: 2025-02-27

## 2025-02-27 RX ADMIN — Medication 400 MILLIGRAM(S): at 05:20

## 2025-02-27 RX ADMIN — Medication 0.5 MILLIGRAM(S): at 11:11

## 2025-02-27 RX ADMIN — Medication 1000 MILLIGRAM(S): at 05:50

## 2025-02-27 RX ADMIN — SODIUM CHLORIDE 75 MILLILITER(S): 9 INJECTION, SOLUTION INTRAVENOUS at 05:09

## 2025-02-27 RX ADMIN — Medication 25 GRAM(S): at 05:08

## 2025-02-27 RX ADMIN — Medication 4 MILLIGRAM(S): at 11:11

## 2025-02-27 RX ADMIN — Medication 0.5 MILLIGRAM(S): at 11:15

## 2025-02-27 NOTE — PROGRESS NOTE ADULT - ASSESSMENT
44 F no PMH p/w 3 days of RUQ pain consistent with acute cholecystitis.     Plan:  - OR today   - NPO for OR   - IVF  - IV zosyn  - Zofran PRN  - Pain control: IV tylenol  - DVT ppx: Lovenox    ACS/Trauma Surgery  [phone]

## 2025-02-27 NOTE — PROGRESS NOTE ADULT - SUBJECTIVE AND OBJECTIVE BOX
Surgery Attending  No events overnight.   Patient seen and examined. Plan for a laparoscopic cholecystectomy this morning. I have explained the risks and beneftis to the patient.   NPO  IVF  Consent obtained. 
SURGERY DAILY PROGRESS NOTE:       SUBJECTIVE/ROS: Patient seen and evaluated on AM rounds.   Awaiting the OR this morning.        OBJECTIVE:    Vital Signs Last 24 Hrs  T(C): 37 (27 Feb 2025 07:58), Max: 37.1 (26 Feb 2025 09:50)  T(F): 98.6 (27 Feb 2025 07:58), Max: 98.7 (26 Feb 2025 09:50)  HR: 58 (27 Feb 2025 07:58) (56 - 85)  BP: 116/76 (27 Feb 2025 07:58) (105/70 - 127/84)  BP(mean): --  RR: 16 (27 Feb 2025 07:58) (16 - 18)  SpO2: 100% (27 Feb 2025 07:58) (96% - 100%)    Parameters below as of 27 Feb 2025 07:58  Patient On (Oxygen Delivery Method): room air      I&O's Detail    26 Feb 2025 07:01  -  27 Feb 2025 07:00  --------------------------------------------------------  IN:    Lactated Ringers: 900 mL  Total IN: 900 mL    OUT:    Voided (mL): 400 mL  Total OUT: 400 mL    Total NET: 500 mL      27 Feb 2025 07:01  -  27 Feb 2025 09:48  --------------------------------------------------------  IN:  Total IN: 0 mL    OUT:    Oral Fluid: 0 mL    Voided (mL): 200 mL  Total OUT: 200 mL    Total NET: -200 mL        Daily Height in cm: 167.64 (27 Feb 2025 07:18)    Daily   MEDICATIONS  (STANDING):  influenza   Vaccine 0.5 milliLiter(s) IntraMuscular once    MEDICATIONS  (PRN):      LABS:                        12.1   6.21  )-----------( 275      ( 27 Feb 2025 04:13 )             35.8     02-27    138  |  109[H]  |  11  ----------------------------<  84  3.7   |  20[L]  |  0.76    Ca    8.5      27 Feb 2025 04:13  Phos  3.8     02-27  Mg     2.1     02-27    TPro  6.1  /  Alb  3.3  /  TBili  0.4  /  DBili  0.1  /  AST  15  /  ALT  16  /  AlkPhos  55  02-27    PT/INR - ( 27 Feb 2025 04:14 )   PT: 11.0 sec;   INR: 0.96 ratio         PTT - ( 27 Feb 2025 04:14 )  PTT:28.8 sec  Urinalysis Basic - ( 27 Feb 2025 04:13 )    Color: x / Appearance: x / SG: x / pH: x  Gluc: 84 mg/dL / Ketone: x  / Bili: x / Urobili: x   Blood: x / Protein: x / Nitrite: x   Leuk Esterase: x / RBC: x / WBC x   Sq Epi: x / Non Sq Epi: x / Bacteria: x                PHYSICAL EXAM:  Constitutional: well developed, well nourished, NAD  Chest: nonlabored breathing on room air   Abdomen: soft, TTP RUQ, nondistended

## 2025-02-27 NOTE — ASU DISCHARGE PLAN (ADULT/PEDIATRIC) - FINANCIAL ASSISTANCE
[facility] provides services at a reduced cost to those who are determined to be eligible through [facility]’s financial assistance program. Information regarding [facility]’s financial assistance program can be found by going to [email or web address] or by calling [phone].

## 2025-02-27 NOTE — ASU DISCHARGE PLAN (ADULT/PEDIATRIC) - A. DRIVE A CAR, OPERATE POWER TOOLS OR MACHINERY
(CD 0.85 OU) -- Neg Fm Hx. Risk of cupping. Cont to observe off gtts. Patient is considered Low Risk. Statement Selected

## 2025-02-27 NOTE — ASU DISCHARGE PLAN (ADULT/PEDIATRIC) - CARE PROVIDER_API CALL
Nikki Goodman  Surgical Critical Care  [address] [place] [address], [address]  [place] [address]  Phone: [phone]  Fax: [phone]  Follow Up Time: 2 weeks

## 2025-02-27 NOTE — PRE-OP CHECKLIST - LATEX ALLERGY
Last Appt:  2/3/2020  Next Appt:   5/11/2020  Med verified in [facility]    Daughter aware that patient will need to sign contract at next refill.
no

## 2025-02-27 NOTE — ASU DISCHARGE PLAN (ADULT/PEDIATRIC) - ASU DC SPECIAL INSTRUCTIONSFT
You may use tylenol or motrin for pain control every 6 hours, with oxycodone as needed if pain is not controlled with the tylenol/motrin. We suggest staggering the tylenol motrin every 3 hours for maximum pain relief.     You may shower 24 hours after the operation, do not soak in the tub, pool, or ocean. You can take the outer dressing off in two days (Saturday). The steri-strips (small tape underneath) will fall off on their own, do not pull them off. You will follow up outpatient with Dr. Goodman to monitor your progress.     You should call the doctor's office if you develop intractable nausea, vomiting, or pain that cannot be controlled with oxycodone. If the doctor's office is not open or you feel this is an emergency, please call 911 or visit the nearest emergency room.

## 2025-02-28 PROBLEM — Z78.9 OTHER SPECIFIED HEALTH STATUS: Chronic | Status: ACTIVE | Noted: 2025-02-26

## 2025-03-04 LAB — SURGICAL PATHOLOGY STUDY: SIGNIFICANT CHANGE UP

## 2025-03-13 ENCOUNTER — TRANSCRIPTION ENCOUNTER (OUTPATIENT)
Age: 45
End: 2025-03-13

## 2025-03-13 ENCOUNTER — INPATIENT (INPATIENT)
Facility: HOSPITAL | Age: 45
LOS: 2 days | Discharge: HOME | DRG: 948 | End: 2025-03-16
Attending: SURGERY | Admitting: SURGERY
Payer: MEDICAID

## 2025-03-13 VITALS
DIASTOLIC BLOOD PRESSURE: 101 MMHG | RESPIRATION RATE: 16 BRPM | HEART RATE: 72 BPM | OXYGEN SATURATION: 98 % | TEMPERATURE: 98 F | SYSTOLIC BLOOD PRESSURE: 149 MMHG | WEIGHT: 162.92 LBS | HEIGHT: 66 IN

## 2025-03-13 LAB
ALBUMIN SERPL ELPH-MCNC: 3.8 G/DL — SIGNIFICANT CHANGE UP (ref 3.3–5)
ALT FLD-CCNC: 735 U/L — HIGH (ref 10–45)
ANION GAP SERPL CALC-SCNC: 15 MMOL/L — SIGNIFICANT CHANGE UP (ref 5–17)
APPEARANCE UR: CLEAR — SIGNIFICANT CHANGE UP
AST SERPL-CCNC: 531 U/L — HIGH (ref 10–40)
BACTERIA # UR AUTO: NEGATIVE /HPF — SIGNIFICANT CHANGE UP
BASOPHILS NFR BLD AUTO: 0.8 % — SIGNIFICANT CHANGE UP (ref 0–2)
BILIRUB SERPL-MCNC: 3.5 MG/DL — HIGH (ref 0.2–1.2)
BILIRUB UR-MCNC: ABNORMAL
BUN SERPL-MCNC: 11 MG/DL — SIGNIFICANT CHANGE UP (ref 7–23)
CALCIUM SERPL-MCNC: 8.7 MG/DL — SIGNIFICANT CHANGE UP (ref 8.4–10.5)
CAST: 0 /LPF — SIGNIFICANT CHANGE UP (ref 0–4)
CHLORIDE SERPL-SCNC: 102 MMOL/L — SIGNIFICANT CHANGE UP (ref 96–108)
CO2 SERPL-SCNC: 17 MMOL/L — LOW (ref 22–31)
COLOR SPEC: SIGNIFICANT CHANGE UP
CREAT SERPL-MCNC: 0.5 MG/DL — SIGNIFICANT CHANGE UP (ref 0.5–1.3)
DIFF PNL FLD: NEGATIVE — SIGNIFICANT CHANGE UP
EGFR: 118 ML/MIN/1.73M2 — SIGNIFICANT CHANGE UP
EGFR: 118 ML/MIN/1.73M2 — SIGNIFICANT CHANGE UP
EOSINOPHIL # BLD AUTO: 0.17 K/UL — SIGNIFICANT CHANGE UP (ref 0–0.5)
EOSINOPHIL NFR BLD AUTO: 2.7 % — SIGNIFICANT CHANGE UP (ref 0–6)
GLUCOSE SERPL-MCNC: 80 MG/DL — SIGNIFICANT CHANGE UP (ref 70–99)
GLUCOSE UR QL: NEGATIVE MG/DL — SIGNIFICANT CHANGE UP
HCG SERPL-ACNC: <2 MIU/ML — SIGNIFICANT CHANGE UP
HCT VFR BLD CALC: 39.1 % — SIGNIFICANT CHANGE UP (ref 34.5–45)
HGB BLD-MCNC: 13.2 G/DL — SIGNIFICANT CHANGE UP (ref 11.5–15.5)
IMM GRANULOCYTES NFR BLD AUTO: 0.5 % — SIGNIFICANT CHANGE UP (ref 0–0.9)
KETONES UR-MCNC: NEGATIVE MG/DL — SIGNIFICANT CHANGE UP
LEUKOCYTE ESTERASE UR-ACNC: NEGATIVE — SIGNIFICANT CHANGE UP
LIDOCAIN IGE QN: 43 U/L — SIGNIFICANT CHANGE UP (ref 7–60)
LYMPHOCYTES # BLD AUTO: 1.02 K/UL — SIGNIFICANT CHANGE UP (ref 1–3.3)
LYMPHOCYTES # BLD AUTO: 16.4 % — SIGNIFICANT CHANGE UP (ref 13–44)
MCHC RBC-ENTMCNC: 31.4 PG — SIGNIFICANT CHANGE UP (ref 27–34)
MCHC RBC-ENTMCNC: 33.8 G/DL — SIGNIFICANT CHANGE UP (ref 32–36)
MCV RBC AUTO: 93.1 FL — SIGNIFICANT CHANGE UP (ref 80–100)
MONOCYTES # BLD AUTO: 0.42 K/UL — SIGNIFICANT CHANGE UP (ref 0–0.9)
MONOCYTES NFR BLD AUTO: 6.8 % — SIGNIFICANT CHANGE UP (ref 2–14)
NEUTROPHILS # BLD AUTO: 4.53 K/UL — SIGNIFICANT CHANGE UP (ref 1.8–7.4)
NEUTROPHILS NFR BLD AUTO: 72.8 % — SIGNIFICANT CHANGE UP (ref 43–77)
NITRITE UR-MCNC: NEGATIVE — SIGNIFICANT CHANGE UP
NRBC BLD AUTO-RTO: 0 /100 WBCS — SIGNIFICANT CHANGE UP (ref 0–0)
PH UR: 5.5 — SIGNIFICANT CHANGE UP (ref 5–8)
PLATELET # BLD AUTO: 315 K/UL — SIGNIFICANT CHANGE UP (ref 150–400)
POTASSIUM SERPL-MCNC: 5.4 MMOL/L — HIGH (ref 3.5–5.3)
POTASSIUM SERPL-SCNC: 5.4 MMOL/L — HIGH (ref 3.5–5.3)
PROT UR-MCNC: NEGATIVE MG/DL — SIGNIFICANT CHANGE UP
RBC # BLD: 4.2 M/UL — SIGNIFICANT CHANGE UP (ref 3.8–5.2)
RBC # FLD: 13.1 % — SIGNIFICANT CHANGE UP (ref 10.3–14.5)
RBC CASTS # UR COMP ASSIST: 1 /HPF — SIGNIFICANT CHANGE UP (ref 0–4)
SODIUM SERPL-SCNC: 134 MMOL/L — LOW (ref 135–145)
SP GR SPEC: 1.01 — SIGNIFICANT CHANGE UP (ref 1–1.03)
UROBILINOGEN FLD QL: 1 MG/DL — SIGNIFICANT CHANGE UP (ref 0.2–1)
WBC # BLD: 6.22 K/UL — SIGNIFICANT CHANGE UP (ref 3.8–10.5)
WBC # FLD AUTO: 6.22 K/UL — SIGNIFICANT CHANGE UP (ref 3.8–10.5)
WBC UR QL: 1 /HPF — SIGNIFICANT CHANGE UP (ref 0–5)

## 2025-03-13 PROCEDURE — 99285 EMERGENCY DEPT VISIT HI MDM: CPT

## 2025-03-13 PROCEDURE — 74177 CT ABD & PELVIS W/CONTRAST: CPT | Mod: 26

## 2025-03-13 RX ADMIN — Medication 2 MILLIGRAM(S): at 21:33

## 2025-03-13 NOTE — ED PROVIDER NOTE - OBJECTIVE STATEMENT
44-year-old female with [facility]ole[facility] weeks ago presenting with abdominal pain    Patient states having cholecystectomy 2 weeks ago starting on Saturday patient started having epigastric pain along with nausea vomiting.  And takes meals has epigastric fullness and pain. Pt also endorses dysuria, and jaundice. Pt denies CP, SOB and back pain

## 2025-03-13 NOTE — ED PROVIDER NOTE - ATTENDING CONTRIBUTION TO CARE
upper abdominal pain and dark urine, may be more yellow too  epigastric ttp  wo rebound or guarding  ctap w iv contrast. cbc/cmp/ua/surg consult

## 2025-03-13 NOTE — ED ADULT NURSE NOTE - OBJECTIVE STATEMENT
Patient is 44 year old female complaining of abdominal pain. Patient reports she had a cholecystectomy 2 weeks ago then starting on Saturday patient started having epigastric pain along with nausea vomiting and dysuria. On assessment patient is A&Ox4, breathing comfortably on room air, unlabored, no retractions, no nasal flaring, no accessory muscle use, no cough, chest rise and fall equal bilaterally, NSR on the cardiac monitor, no JVD, no edema noted, strong bilateral peripheral pulses, abdomen is soft, symmetric, and suprapubic tenderness, skin is excepted color for ethnicity without lesions or rashes, skin warm and dry, capillary refill is less than 3 seconds, normal skin turgor with no tenting.

## 2025-03-13 NOTE — ED PROVIDER NOTE - CLINICAL SUMMARY MEDICAL DECISION MAKING FREE TEXT BOX
Pt concerning for UTI, will obtain UA, UC and basic labs, pt concerning for hepatic pathology or choledocolithiasis will obtain CT abd pelvis and will assess LFTs

## 2025-03-13 NOTE — ED ADULT TRIAGE NOTE - CHIEF COMPLAINT QUOTE
abd pain, recent gall bladder removal 2 weeks ago. Endorsing orange urine, on no medications besides tylenol and motrin

## 2025-03-13 NOTE — ED ADULT TRIAGE NOTE - HEIGHT IN FEET
[facility] Medicine Progress Note  V 10.25      Date of Admission: 10/24/2024  Hospital Day: 7    Chief Admission Complaint:  AMS    Subjective:  Patient seen at bedside. Patient is lethargic and somnolent. He is more interactive this morning and able to follow commands.    Presenting Admission History:       Kaleb Persaud is a 64 y.o. male, chronic alcoholic who was brought to the emergency room for evaluation following fall.  Patient is intermittently confused which makes history limited.  I was unable to obtain any meaningful history from the patient.  Apparently, patient was found down at home.  He was noted to be confused.  Patient is complaining of generalized weakness otherwise denies any other complaint.  He denies chest pain, no shortness of breath, no fever, no chills, no nausea, no vomiting, no diarrhea.     On presentation to the emergency room, patient was noted to be afebrile, temperature 98.1 °F, pulse rate of 111 bpm, respiratory rate of 23, blood pressure 149/103 mmHg.  Patient saturating 100% on room air.  Labs significant for sodium 138, potassium 5.6, blood rate 79, bicarb 16, anion gap 13, glucose 132, CK level 1090.  Albumin 2.7, , ALT 79, alkaline phosphatase 337 and total bilirubin 2.1.  WBC 17.0, anemia with hemoglobin 8.8 and hematocrit 26.0.  .1.  CT scan of the brain as well as CT scan of the cervical spine with unrevealing.  Patient was started on hypertonic saline in the emergency room.  He has been admitted to the hospital for further evaluation and management.    Assessment/Plan:      Current Principal Problem:  Hyponatremia    Acute encephalopathy likely secondary to metabolic derangements  Acute on chronic hyponatremia  JCARLOS  -Patient noted with sodium level-108  -Hx of chronic hyponatremia with sodium level ranges between 127-132  -Chronic alcoholic.  Hyponatremia in the setting of chronic alcohol abuse likely hypo-osmolar hyponatremia due beer potomania/ decrease  5

## 2025-03-13 NOTE — ED PROVIDER NOTE - PHYSICAL EXAMINATION
GENERAL: NAD, lying in bed comfortably  HEART: Regular rate and rhythm, no murmurs, rubs, or gallops  LUNGS: Unlabored respirations.  Clear to auscultation bilaterally, no crackles, wheezing, or rhonchi  ABDOMEN: Soft, epigastric tenderness, Suprapubic tenderness, nondistended, +BS  EXTREMITIES: 2+ peripheral pulses bilaterally. No clubbing, cyanosis, or edema  NERVOUS SYSTEM:  A&Ox3, moving all extremities, no focal deficits   SKIN: No rashes or lesions

## 2025-03-14 DIAGNOSIS — R79.89 OTHER SPECIFIED ABNORMAL FINDINGS OF BLOOD CHEMISTRY: ICD-10-CM

## 2025-03-14 LAB
ALBUMIN SERPL ELPH-MCNC: 3.7 G/DL — SIGNIFICANT CHANGE UP (ref 3.3–5)
ALP SERPL-CCNC: 238 U/L — HIGH (ref 40–120)
ALT FLD-CCNC: 677 U/L — HIGH (ref 10–45)
ANION GAP SERPL CALC-SCNC: 12 MMOL/L — SIGNIFICANT CHANGE UP (ref 5–17)
APTT BLD: 29.9 SEC — SIGNIFICANT CHANGE UP (ref 24.5–35.6)
AST SERPL-CCNC: 401 U/L — HIGH (ref 10–40)
BILIRUB SERPL-MCNC: 3.4 MG/DL — HIGH (ref 0.2–1.2)
BLD GP AB SCN SERPL QL: NEGATIVE — SIGNIFICANT CHANGE UP
BUN SERPL-MCNC: 10 MG/DL — SIGNIFICANT CHANGE UP (ref 7–23)
CALCIUM SERPL-MCNC: 8.8 MG/DL — SIGNIFICANT CHANGE UP (ref 8.4–10.5)
CHLORIDE SERPL-SCNC: 103 MMOL/L — SIGNIFICANT CHANGE UP (ref 96–108)
CO2 SERPL-SCNC: 22 MMOL/L — SIGNIFICANT CHANGE UP (ref 22–31)
CREAT SERPL-MCNC: 0.57 MG/DL — SIGNIFICANT CHANGE UP (ref 0.5–1.3)
EGFR: 114 ML/MIN/1.73M2 — SIGNIFICANT CHANGE UP
EGFR: 114 ML/MIN/1.73M2 — SIGNIFICANT CHANGE UP
GLUCOSE SERPL-MCNC: 90 MG/DL — SIGNIFICANT CHANGE UP (ref 70–99)
HCT VFR BLD CALC: 42.5 % — SIGNIFICANT CHANGE UP (ref 34.5–45)
HGB BLD-MCNC: 14 G/DL — SIGNIFICANT CHANGE UP (ref 11.5–15.5)
INR BLD: 0.91 RATIO — SIGNIFICANT CHANGE UP (ref 0.85–1.16)
MAGNESIUM SERPL-MCNC: 2.2 MG/DL — SIGNIFICANT CHANGE UP (ref 1.6–2.6)
MCHC RBC-ENTMCNC: 30.8 PG — SIGNIFICANT CHANGE UP (ref 27–34)
MCHC RBC-ENTMCNC: 32.9 G/DL — SIGNIFICANT CHANGE UP (ref 32–36)
MCV RBC AUTO: 93.6 FL — SIGNIFICANT CHANGE UP (ref 80–100)
NRBC BLD AUTO-RTO: 0 /100 WBCS — SIGNIFICANT CHANGE UP (ref 0–0)
PHOSPHATE SERPL-MCNC: 3.1 MG/DL — SIGNIFICANT CHANGE UP (ref 2.5–4.5)
PLATELET # BLD AUTO: 338 K/UL — SIGNIFICANT CHANGE UP (ref 150–400)
POTASSIUM SERPL-MCNC: 3.9 MMOL/L — SIGNIFICANT CHANGE UP (ref 3.5–5.3)
POTASSIUM SERPL-SCNC: 3.9 MMOL/L — SIGNIFICANT CHANGE UP (ref 3.5–5.3)
PROT SERPL-MCNC: 6.6 G/DL — SIGNIFICANT CHANGE UP (ref 6–8.3)
PROTHROM AB SERPL-ACNC: 10.5 SEC — SIGNIFICANT CHANGE UP (ref 9.9–13.4)
RBC # BLD: 4.54 M/UL — SIGNIFICANT CHANGE UP (ref 3.8–5.2)
RBC # FLD: 12.9 % — SIGNIFICANT CHANGE UP (ref 10.3–14.5)
RH IG SCN BLD-IMP: POSITIVE — SIGNIFICANT CHANGE UP
SODIUM SERPL-SCNC: 137 MMOL/L — SIGNIFICANT CHANGE UP (ref 135–145)
WBC # BLD: 7.74 K/UL — SIGNIFICANT CHANGE UP (ref 3.8–10.5)
WBC # FLD AUTO: 7.74 K/UL — SIGNIFICANT CHANGE UP (ref 3.8–10.5)

## 2025-03-14 PROCEDURE — 99222 1ST HOSP IP/OBS MODERATE 55: CPT

## 2025-03-14 PROCEDURE — 78226 HEPATOBILIARY SYSTEM IMAGING: CPT | Mod: 26

## 2025-03-14 PROCEDURE — 74183 MRI ABD W/O CNTR FLWD CNTR: CPT | Mod: 26

## 2025-03-14 RX ORDER — HYDROMORPHONE/SOD CHLOR,ISO/PF 2 MG/10 ML
0.5 SYRINGE (ML) INJECTION EVERY 4 HOURS
Refills: 0 | Status: DISCONTINUED | OUTPATIENT
Start: 2025-03-14 | End: 2025-03-16

## 2025-03-14 RX ORDER — ACETAMINOPHEN 500 MG/5ML
1000 LIQUID (ML) ORAL EVERY 6 HOURS
Refills: 0 | Status: COMPLETED | OUTPATIENT
Start: 2025-03-14 | End: 2025-03-15

## 2025-03-14 RX ORDER — INFLUENZA A VIRUS A/IDAHO/07/2018 (H1N1) ANTIGEN (MDCK CELL DERIVED, PROPIOLACTONE INACTIVATED, INFLUENZA A VIRUS A/INDIANA/08/2018 (H3N2) ANTIGEN (MDCK CELL DERIVED, PROPIOLACTONE INACTIVATED), INFLUENZA B VIRUS B/SINGAPORE/INFTT-16-0610/2016 ANTIGEN (MDCK CELL DERIVED, PROPIOLACTONE INACTIVATED), INFLUENZA B VIRUS B/IOWA/06/2017 ANTIGEN (MDCK CELL DERIVED, PROPIOLACTONE INACTIVATED) 15; 15; 15; 15 UG/.5ML; UG/.5ML; UG/.5ML; UG/.5ML
0.5 INJECTION, SUSPENSION INTRAMUSCULAR ONCE
Refills: 0 | Status: DISCONTINUED | OUTPATIENT
Start: 2025-03-14 | End: 2025-03-16

## 2025-03-14 RX ORDER — HYDROMORPHONE/SOD CHLOR,ISO/PF 2 MG/10 ML
0.2 SYRINGE (ML) INJECTION EVERY 4 HOURS
Refills: 0 | Status: DISCONTINUED | OUTPATIENT
Start: 2025-03-14 | End: 2025-03-16

## 2025-03-14 RX ORDER — ONDANSETRON HCL/PF 4 MG/2 ML
4 VIAL (ML) INJECTION EVERY 6 HOURS
Refills: 0 | Status: DISCONTINUED | OUTPATIENT
Start: 2025-03-14 | End: 2025-03-16

## 2025-03-14 RX ORDER — SODIUM CHLORIDE 9 G/1000ML
1000 INJECTION, SOLUTION INTRAVENOUS
Refills: 0 | Status: DISCONTINUED | OUTPATIENT
Start: 2025-03-14 | End: 2025-03-16

## 2025-03-14 RX ADMIN — Medication 0.5 MILLIGRAM(S): at 16:33

## 2025-03-14 RX ADMIN — Medication 400 MILLIGRAM(S): at 11:54

## 2025-03-14 RX ADMIN — Medication 0.5 MILLIGRAM(S): at 17:06

## 2025-03-14 RX ADMIN — SODIUM CHLORIDE 100 MILLILITER(S): 9 INJECTION, SOLUTION INTRAVENOUS at 20:45

## 2025-03-14 RX ADMIN — Medication 0.2 MILLIGRAM(S): at 11:00

## 2025-03-14 RX ADMIN — Medication 1000 MILLIGRAM(S): at 21:15

## 2025-03-14 RX ADMIN — Medication 0.2 MILLIGRAM(S): at 10:25

## 2025-03-14 RX ADMIN — Medication 1000 MILLIGRAM(S): at 05:35

## 2025-03-14 RX ADMIN — Medication 400 MILLIGRAM(S): at 20:45

## 2025-03-14 RX ADMIN — Medication 4 MILLIGRAM(S): at 02:25

## 2025-03-14 RX ADMIN — Medication 0.2 MILLIGRAM(S): at 03:59

## 2025-03-14 RX ADMIN — Medication 0.2 MILLIGRAM(S): at 04:29

## 2025-03-14 RX ADMIN — Medication 400 MILLIGRAM(S): at 05:05

## 2025-03-14 RX ADMIN — Medication 1000 MILLIGRAM(S): at 12:15

## 2025-03-14 NOTE — CONSULT NOTE ADULT - ASSESSMENT
44-year-old with PMH CCY 2/27/2025 presenting with abdominal pain found to have elevations in liver chemistries.     #Hx ccy 2/27  #Abdominal pain   #Elevated liver chemistries  [facility] is consulted due to concern for possible choledocholithiasis in a patient with recent CCY on 2/27 now presenting with right upper quadrant pain and found to have mildly elevated liver chemistries with AST//735, , TB 3.5.  CT with CBD in the upper normal range without obvious evidence of stone.  Pending MRI.  No evidence of cholangitis. 44-year-old with PMH CCY 2/27/2025 presenting with abdominal pain found to have elevations in liver chemistries.     #Hx ccy 2/27  #Abdominal pain   #Elevated liver chemistries  [facility] is consulted due to concern for possible biliary obstruction in a patient with recent CCY on 2/27 now presenting with right upper quadrant pain and found to have mildly elevated liver chemistries with AST//735, , TB 3.5.  CT with CBD in the upper normal range without obvious evidence of stone.  Pending MRI.  No evidence of cholangitis. Also w/ ?c/f bile leak given small collection at ccy site. No signs of abscess. Etiology of elevated liver chemistries unclear - given normal CBD on CT, recommend MRI for further eval.   Recommendations:  -Agree w/ MRCP for eval of possible biliary obstruction   -HIDA scan to assess for bile leak   -No indication for urgent endoscopic procedure. Will determine need based on above studies  -Low threshold to start abx if rising white count  -Rest per primary team    Note incomplete until finalized by attending signature/attestation.    Kiara Pacheco  GI/Hepatology Fellow PGY5    NON-URGENT CONSULTS:  Please email [email or web address] OR [email or web address]  AT NIGHT AND ON WEEKENDS:  Available on [employer] Teams  [phone] (Long Range Pager)    For urgent consults, please contact on call GI team. See Amion schedule ([facility]) or [employer] system ([facility]) 44-year-old with PMH CCY 2/27/2025 presenting with abdominal pain found to have elevations in liver chemistries.     #Hx ccy 2/27  #Abdominal pain   #Elevated liver chemistries  [facility] is consulted due to concern for possible biliary obstruction in a patient with recent CCY on 2/27 now presenting with right upper quadrant pain and found to have mildly elevated liver chemistries with AST//735, , TB 3.5.  CT with CBD in the upper normal range without obvious evidence of stone.  Pending MRI.  No evidence of cholangitis. Also w/ ?c/f bile leak given small collection at ccy site. No signs of abscess. Etiology of elevated liver chemistries unclear - given normal CBD on CT, recommend MRI for further eval.   Recommendations:  -Agree w/ MRCP for eval of possible biliary obstruction   -HIDA scan to assess for bile leak   -No indication for urgent endoscopic procedure. Will determine need based on above studies  -Low threshold to start abx if rising white count  -Rest per primary team    Note incomplete until finalized by attending signature/attestation.    Kiara Pacheco  GI/Hepatology Fellow PGY5    NON-URGENT CONSULTS:  Please email [email or web address]  AT NIGHT AND ON WEEKENDS:  Available on [employer] Teams  [phone] (Long Range Pager)    For urgent consults, please contact on call GI team. See Amion schedule ([facility]) or [employer] system ([facility])

## 2025-03-14 NOTE — CONSULT NOTE ADULT - ATTENDING COMMENTS
Patient seen and examined in the early evening of 3/14/2025  Discussed with GI fellow earlier in the day.    Impression:    #1.  Upper abdominal pain  #2.  Abnormal LFTs  #3.  Status post cholecystectomy on 2/27/2025 without biliary dilatation on CT scan but with collection in gallbladder fossa    Differential diagnosis includes choledocholithiasis and less likely bile leak.    Recommendations:  #1.  Follow CBC/LFTs  #2.  MRI/MRCP to look for choledocholithiasis  #3.  HIDA scan to rule out bile leak  #4.  IV fluids  #5.  Consider upper endoscopic ultrasound and/or ERCP based on results of above testing and clinical course.  Would be performed on Monday, 3/17/2025, unless severe biliary sepsis develops.

## 2025-03-14 NOTE — H&P ADULT - NSHPLABSRESULTS_GEN_ALL_CORE
13.2   6.22  )-----------( 315      ( 13 Mar 2025 21:43 )             39.1   03-13    134[L]  |  102  |  11  ----------------------------<  80  5.4[H]   |  17[L]  |  0.50    Ca    8.7      13 Mar 2025 21:43    TPro  7.3  /  Alb  3.8  /  TBili  3.5[H]  /  DBili  x   /  AST  531[H]  /  ALT  735[H]  /  AlkPhos  220[H]  03-13  < from: CT Abdomen and Pelvis w/ IV Cont (03.13.25 @ 21:53) >    IMPRESSION:  Fibroid uterus inclusive of multiple pedunculated fibroids, the largest   measuring 7.2 cm.  Tiny collection at the cholecystectomy site. No evidence of abscess.  Otherwise no acute pathology in the abdomen or pelvis.        --- End of Report ---    < end of copied text >

## 2025-03-14 NOTE — H&P ADULT - NSHPREVIEWOFSYSTEMS_GEN_ALL_CORE
REVIEW OF SYSTEMS:    CONSTITUTIONAL:  No weakness, fevers, or chills  EYES/ENT:  No visual changes, vertigo, or throat pain   NECK:  No pain or stiffness  RESPIRATORY:  No SOB, cough, wheezing, or hemoptysis  CARDIOVASCULAR:  No chest pain or palpitations  GASTROINTESTINAL:  + abdominal pain, +nausea, -vomiting, or -hematemesis; No diarrhea or constipation; No melena or hematochezia.  GENITOURINARY:  No dysuria, change in frequency, or hematuria  NEUROLOGICAL:  No numbness or weakness  SKIN:  No itching or rashes

## 2025-03-14 NOTE — H&P ADULT - ASSESSMENT
44F no PMHx who presented on Feb 26 with 3 days of RUQ abdominal pain associated whit food and began with eating a fatty meal, patient underwent an uncomplicated lap javier on 02/27/25. On Saturday patient started having epigastric pain along with nausea/vomiting.  And takes meals has epigastric fullness and pain. Pt also endorses dysuria, and jaundice. Pt denies CP, SOB and back pain. Patient AFVSS. Labs significant for Tbili  3.5, Alk Phos 220, , . CT scan showing a Tiny collection at the cholecystectomy site. No evidence of abscess. Physical exam significant for mild abdominal tenderness    Plan:  - NPO  - IVF   - Pain Control  - Trend LFTs  - MRCP   - Will hold DVT ppx for poss ERCP if rising LFTs, and positive MRCP  - Holding antibiotics for being afebrile, no WBC     Discussed with Dr. Marroquin   ACS

## 2025-03-14 NOTE — H&P ADULT - NSHPPHYSICALEXAM_GEN_ALL_CORE
ICU Vital Signs Last 24 Hrs  T(C): 36.7 (13 Mar 2025 21:10), Max: 36.8 (13 Mar 2025 19:33)  T(F): 98.1 (13 Mar 2025 21:10), Max: 98.3 (13 Mar 2025 19:33)  HR: 64 (13 Mar 2025 21:10) (64 - 72)  BP: 135/94 (13 Mar 2025 21:10) (135/94 - 149/101)  BP(mean): --  ABP: --  ABP(mean): --  RR: 20 (13 Mar 2025 21:10) (16 - 20)  SpO2: 99% (13 Mar 2025 21:10) (98% - 99%)    O2 Parameters below as of 13 Mar 2025 21:10  Patient On (Oxygen Delivery Method): room air        PHYSICAL EXAM:  GENERAL: NAD, well-developed  HEAD:  Atraumatic, Normocephalic  EYES: EOMI, , conjunctiva and sclera clear  NECK: Supple, No JVD  CHEST/LUNG: No distress, speaks in full sentences, on RA  HEART: WWP, mentating  ABDOMEN: Soft, mild tenderness to palpation in the RUQ  PSYCH: AAOx3  NEUROLOGY: non-focal  SKIN: No rashes or lesions

## 2025-03-14 NOTE — PATIENT PROFILE ADULT - NSPROPTRIGHTSUPPORTNAME_GEN_A_NUR
I updated patient's daughter at bedside regarding plan of care and overall patient status. All questions/concerns were answered and addressed.    michael Crespo (partner)

## 2025-03-14 NOTE — H&P ADULT - ATTENDING COMMENTS
ATTENDING ATTESTATION  I have seen and examined this patient with the resident housestaff. I have reviewed all labs, imaging and reports. I have participated in formulating the plan, and have read and agree with the history, ROS, exam, assessment and plan as stated above.     s/p myra javier on 2/27.   Re-presents now with abdominal pain and elevated tbili 3.5 concerning for retained stone.   CT shows only a very small GB fossa fluid collection unlikely to be causing the elevated tbili.   WBC normal.   Admit, MRCP, GI for possible ERCP.     Jeannine Marroquin M.D., M.S.  Division of Acute Care Surgery Improved

## 2025-03-14 NOTE — H&P ADULT - HISTORY OF PRESENT ILLNESS
44F no PMHx who presented on Feb 26 with 3 days of RUQ abdominal pain associated whit food and began with eating a fatty meal, patient underwent an uncomplicated lap javier on 02/27/25. On Saturday patient started having epigastric pain along with nausea/vomiting.  And takes meals has epigastric fullness and pain. Pt also endorses dysuria, and jaundice. Pt denies CP, SOB and back pain. Patient AFVSS. Labs significant for Tbili  3.5, Alk Phos 220, , . CT scan showing a Tiny collection at the cholecystectomy site. No evidence of abscess. Physical exam significant for mild abdominal tenderness

## 2025-03-15 LAB
ADD ON TEST-SPECIMEN IN LAB: SIGNIFICANT CHANGE UP
ALBUMIN SERPL ELPH-MCNC: 3.2 G/DL — LOW (ref 3.3–5)
ALP SERPL-CCNC: 238 U/L — HIGH (ref 40–120)
ALT FLD-CCNC: 459 U/L — HIGH (ref 10–45)
ANION GAP SERPL CALC-SCNC: 15 MMOL/L — SIGNIFICANT CHANGE UP (ref 5–17)
AST SERPL-CCNC: 178 U/L — HIGH (ref 10–40)
BILIRUB DIRECT SERPL-MCNC: 3.7 MG/DL — HIGH (ref 0–0.3)
BILIRUB SERPL-MCNC: 5 MG/DL — HIGH (ref 0.2–1.2)
BUN SERPL-MCNC: 10 MG/DL — SIGNIFICANT CHANGE UP (ref 7–23)
CALCIUM SERPL-MCNC: 8.6 MG/DL — SIGNIFICANT CHANGE UP (ref 8.4–10.5)
CHLORIDE SERPL-SCNC: 103 MMOL/L — SIGNIFICANT CHANGE UP (ref 96–108)
CO2 SERPL-SCNC: 20 MMOL/L — LOW (ref 22–31)
CREAT SERPL-MCNC: 0.58 MG/DL — SIGNIFICANT CHANGE UP (ref 0.5–1.3)
EGFR: 114 ML/MIN/1.73M2 — SIGNIFICANT CHANGE UP
EGFR: 114 ML/MIN/1.73M2 — SIGNIFICANT CHANGE UP
GLUCOSE SERPL-MCNC: 73 MG/DL — SIGNIFICANT CHANGE UP (ref 70–99)
HCT VFR BLD CALC: 37.2 % — SIGNIFICANT CHANGE UP (ref 34.5–45)
HGB BLD-MCNC: 12.3 G/DL — SIGNIFICANT CHANGE UP (ref 11.5–15.5)
MAGNESIUM SERPL-MCNC: 2 MG/DL — SIGNIFICANT CHANGE UP (ref 1.6–2.6)
MCHC RBC-ENTMCNC: 30.4 PG — SIGNIFICANT CHANGE UP (ref 27–34)
MCHC RBC-ENTMCNC: 33.1 G/DL — SIGNIFICANT CHANGE UP (ref 32–36)
MCV RBC AUTO: 91.9 FL — SIGNIFICANT CHANGE UP (ref 80–100)
NRBC BLD AUTO-RTO: 0 /100 WBCS — SIGNIFICANT CHANGE UP (ref 0–0)
PHOSPHATE SERPL-MCNC: 3.6 MG/DL — SIGNIFICANT CHANGE UP (ref 2.5–4.5)
PLATELET # BLD AUTO: 304 K/UL — SIGNIFICANT CHANGE UP (ref 150–400)
POTASSIUM SERPL-MCNC: 3.6 MMOL/L — SIGNIFICANT CHANGE UP (ref 3.5–5.3)
POTASSIUM SERPL-SCNC: 3.6 MMOL/L — SIGNIFICANT CHANGE UP (ref 3.5–5.3)
PROT SERPL-MCNC: 5.8 G/DL — LOW (ref 6–8.3)
RBC # BLD: 4.05 M/UL — SIGNIFICANT CHANGE UP (ref 3.8–5.2)
RBC # FLD: 12.8 % — SIGNIFICANT CHANGE UP (ref 10.3–14.5)
SODIUM SERPL-SCNC: 138 MMOL/L — SIGNIFICANT CHANGE UP (ref 135–145)
SPECIMEN SOURCE: SIGNIFICANT CHANGE UP
WBC # BLD: 6.34 K/UL — SIGNIFICANT CHANGE UP (ref 3.8–10.5)
WBC # FLD AUTO: 6.34 K/UL — SIGNIFICANT CHANGE UP (ref 3.8–10.5)

## 2025-03-15 PROCEDURE — 99223 1ST HOSP IP/OBS HIGH 75: CPT | Mod: GC

## 2025-03-15 RX ADMIN — Medication 400 MILLIGRAM(S): at 01:44

## 2025-03-15 RX ADMIN — SODIUM CHLORIDE 100 MILLILITER(S): 9 INJECTION, SOLUTION INTRAVENOUS at 00:28

## 2025-03-15 RX ADMIN — Medication 1000 MILLIGRAM(S): at 02:14

## 2025-03-15 RX ADMIN — Medication 40 MILLIEQUIVALENT(S): at 17:26

## 2025-03-15 NOTE — PROGRESS NOTE ADULT - ASSESSMENT
44F no PMHx who presented on Feb 26 with 3 days of RUQ abdominal pain associated whit food and began with eating a fatty meal, patient underwent an uncomplicated lap javier on 02/27/25. On Saturday patient started having epigastric pain along with nausea/vomiting.  And takes meals has epigastric fullness and pain. Pt also endorses dysuria, and jaundice. Pt denies CP, SOB and back pain. Patient AFVSS. Labs significant for Tbili  3.5, Alk Phos 220, , . CT scan showing a Tiny collection at the cholecystectomy site. No evidence of abscess. Physical exam significant for mild abdominal tenderness    Plan:  - NPO  - IVF   - Pain Control  - Trend LFTs  - MRCP   - Will hold DVT ppx for poss ERCP if rising LFTs, and positive MRCP  - Holding antibiotics for being afebrile, no WBC     Discussed with Dr. Marroquin   ACS 44F no PMHx who presented on Feb 26 with 3 days of RUQ abdominal pain associated whit food and began with eating a fatty meal, patient underwent an uncomplicated lap javier on 02/27/25. On Saturday patient started having epigastric pain along with nausea/vomiting.  And takes meals has epigastric fullness and pain. Pt also endorses dysuria, and jaundice. Pt denies CP, SOB and back pain. Patient AFVSS. Labs significant for Tbili  3.5, Alk Phos 220, , . CT scan showing a Tiny collection at the cholecystectomy site. No evidence of abscess. Physical exam significant for mild abdominal tenderness    Plan:  - NPO  - IVF   - Pain Control  - Trend LFTs,   - Will hold DVT ppx for poss ERCP if rising LFTs, and positive MRCP  - Holding antibiotics for being afebrile, no WBC  - 3/15 f/u MRCP final read, f/u GI     ACS

## 2025-03-15 NOTE — CONSULT NOTE ADULT - SUBJECTIVE AND OBJECTIVE BOX
Chief Complaint:  Patient is a 44y old  Female who presents with a chief complaint of Abdominal Pain (15 Mar 2025 03:51)    HPI:  44-year-old with PMH CCY 2/27/2025 presenting with abdominal pain found to have elevations in liver chemistries. Notes that after the surgery, she was doing well without abd pain, nausea, vomiting for the first week. Then developed RUQ abd pain the following week associated with nausea and poor appetite, no vomiting. Was taking Tylenol (500mg BID)/ibuprofen (600 BID) at home. No OTC meds, herbal supplements. No hx of liver disease. Social alcohol use, once per week, usually 2 mixed drinks. No FHx liver disease.    Allergies:  No Known Allergies      Home Medications:    Hospital Medications:  HYDROmorphone  Injectable 0.2 milliGRAM(s) IV Push every 4 hours PRN  HYDROmorphone  Injectable 0.5 milliGRAM(s) IV Push every 4 hours PRN  influenza   Vaccine 0.5 milliLiter(s) IntraMuscular once  lactated ringers. 1000 milliLiter(s) IV Continuous <Continuous>  ondansetron Injectable 4 milliGRAM(s) IV Push every 6 hours PRN  potassium chloride    Tablet ER 40 milliEquivalent(s) Oral once      PMHX/PSHX:  No pertinent past medical history    No significant past surgical history    ROS:  General:  No wt loss, fevers, chills  ENT:  No dysphagia  CV:  No pain, palpitations  Pulm:  No dyspnea, cough  GI:  +pain, +nausea, No vomiting, No diarrhea, No constipation, No rectal bleeding, No tarry stools  Muscle:  No pain, weakness  Neuro:  No weakness  Heme:  No ecchymosis  Skin:  No rash    PHYSICAL EXAM:   GENERAL:  No acute distress  HEENT:  +scleral icterus  CHEST:  no accessory muscle use  HEART:  Regular rate and rhythm  ABDOMEN:  Soft, non-tender, non-distended  EXTREMITIES: No edema  SKIN:  No rash/ecchymoses  NEURO:  Alert and oriented x 3    Vital Signs:  Vital Signs Last 24 Hrs  T(C): 36.7 (15 Mar 2025 13:05), Max: 37.1 (15 Mar 2025 05:17)  T(F): 98 (15 Mar 2025 13:05), Max: 98.8 (15 Mar 2025 05:17)  HR: 73 (15 Mar 2025 13:05) (62 - 76)  BP: 108/68 (15 Mar 2025 13:05) (108/68 - 146/93)  BP(mean): --  RR: 18 (15 Mar 2025 13:05) (18 - 18)  SpO2: 97% (15 Mar 2025 13:05) (97% - 99%)    Parameters below as of 15 Mar 2025 13:05  Patient On (Oxygen Delivery Method): room air      Daily     Daily     LABS:                        12.3   6.34  )-----------( 304      ( 15 Mar 2025 07:10 )             37.2     Mean Cell Volume: 91.9 fl (03-15-25 @ 07:10)    03-15    138  |  103  |  10  ----------------------------<  73  3.6   |  20[L]  |  0.58    Ca    8.6      15 Mar 2025 07:11  Phos  3.6     03-15  Mg     2.0     03-15    TPro  5.8[L]  /  Alb  3.2[L]  /  TBili  5.0[H]  /  DBili  3.7[H]  /  AST  178[H]  /  ALT  459[H]  /  AlkPhos  238[H]  03-15    LIVER FUNCTIONS - ( 15 Mar 2025 07:11 )  Alb: 3.2 g/dL / Pro: 5.8 g/dL / ALK PHOS: 238 U/L / ALT: 459 U/L / AST: 178 U/L / GGT: x           PT/INR - ( 14 Mar 2025 06:36 )   PT: 10.5 sec;   INR: 0.91 ratio         PTT - ( 14 Mar 2025 06:36 )  PTT:29.9 sec  Urinalysis Basic - ( 15 Mar 2025 07:11 )    Color: x / Appearance: x / SG: x / pH: x  Gluc: 73 mg/dL / Ketone: x  / Bili: x / Urobili: x   Blood: x / Protein: x / Nitrite: x   Leuk Esterase: x / RBC: x / WBC x   Sq Epi: x / Non Sq Epi: x / Bacteria: x                              12.3   6.34  )-----------( 304      ( 15 Mar 2025 07:10 )             37.2                         14.0   7.74  )-----------( 338      ( 14 Mar 2025 06:36 )             42.5                         13.2   6.22  )-----------( 315      ( 13 Mar 2025 21:43 )             39.1       Imaging:    < from: CT Abdomen and Pelvis w/ IV Cont (03.13.25 @ 21:53) >  FINDINGS:  LOWER CHEST: Partially imaged bilateral breast implants.    LIVER: Within normal limits.  BILE DUCTS: Upper normal range of common bile duct. Suboptimal evaluation   of francisca hepatis.  GALLBLADDER: Cholecystectomy. Tiny collection at the cholecystectomy site   (301-39).  SPLEEN: Within normal limits.  PANCREAS: Within normal limits.  ADRENALS: Within normal limits.  KIDNEYS/URETERS: Within normal limits.    BLADDER: Within normal limits.  REPRODUCTIVE ORGANS: Fibroid uterus with multiple pedunculated fibroids,   for example a large heterogeneous fibroid measuring approximately 7.2 x   6.0 cm.    BOWEL: No bowel obstruction. Appendix is normal.  PERITONEUM/RETROPERITONEUM: Within normal limits.  VESSELS: Within normal limits.  LYMPH NODES: No lymphadenopathy.  ABDOMINAL WALL: Within normal limits.  BONES: Well-circumscribed defect in the sacrum near the right S2 neural   foramen (301-77), likely a perineural cyst.    IMPRESSION:  Fibroid uterus inclusive of multiple pedunculated fibroids, the largest   measuring 7.2 cm.  Tiny collection at the cholecystectomy site. No evidence of abscess.  Otherwise no acute pathology in the abdomen or pelvis.        --- End of Report ---    < end of copied text >      < from: MR MRCP w/wo IV Cont (03.14.25 @ 20:17) >  FINDINGS:  LOWER CHEST: Partially visualized bilateral breast implants.    LIVER: Within normal limits.  BILE DUCTS: There are no filling defects within the common bile duct on   T2-weighted sequencing.  GALLBLADDER: Cholecystectomy.  SPLEEN: Within normal limits.  PANCREAS: Normal hyperintense T1 signal of the pancreas. Normal   pancreatic duct.  ADRENALS: Within normal limits.  KIDNEYS/URETERS: Within normal limits.    VISUALIZED PORTIONS:  BOWEL: Within normal limits.  PERITONEUM: No ascites.  VESSELS: Within normal limits.  RETROPERITONEUM/LYMPH NODES: No lymphadenopathy.  ABDOMINAL WALL: Within normal limits.  BONES: Within normal limits.    IMPRESSION:  No MR evidence for choledocholithiasis. No definitive collection in the   postcholecystectomy bed    No abnormal enhancement.    Cholecystectomy.      < end of copied text >      
INITIAL GI CONSULTATION  HPI:  44-year-old with PMH CCY 2025 presenting with abdominal pain.    Appears she had presented on  with right upper quadrant pain.  ED POCUS showed positive sonographic Masters sign and gallbladder wall edema.  Subsequently underwent CCY on 2027 with uncomplicated course.  She now presents with 3 days of right upper quadrant abdominal pain described as epigastric pain with nausea and vomiting.    On arrival, /88, heart rate 70, T98.4.  Labs notable for white count of 7, Hb 14, TB 3.5, up from 0.4 on DC.  AST//735, up from 15/16 on to .  , up from 55 on recent discharge.  CT with IV contrast on 3/13 showing normal-appearing liver, bile duct in the upper normal range of CBD, suboptimal evaluation of francisca hepatis.  There is a tiny collection of the cholecystectomy site without evidence of abscess.  She is pending MRCP.  She was started on IV fluids and pain medications.      FamHx: ***no h/o GI malignancies known  PMH/PSH:  PAST MEDICAL & SURGICAL HISTORY:  No pertinent past medical history      No significant past surgical history          MEDS:  MEDICATIONS  (STANDING):  acetaminophen   IVPB .. 1000 milliGRAM(s) IV Intermittent every 6 hours  influenza   Vaccine 0.5 milliLiter(s) IntraMuscular once  lactated ringers. 1000 milliLiter(s) (100 mL/Hr) IV Continuous <Continuous>    MEDICATIONS  (PRN):  HYDROmorphone  Injectable 0.2 milliGRAM(s) IV Push every 4 hours PRN Moderate Pain (4 - 6)  HYDROmorphone  Injectable 0.5 milliGRAM(s) IV Push every 4 hours PRN Severe Pain (7 - 10)  ondansetron Injectable 4 milliGRAM(s) IV Push every 6 hours PRN Nausea and/or Vomiting    Allergies    No Known Allergies    Intolerances          ______________________________________________________________________  PHYSICAL EXAM:  T(C): 36.5 (25 @ 04:45), Max: 36.9 (25 @ 01:32)  HR: 73 (25 @ 04:45)  BP: 109/69 (25 @ 04:45)  RR: 18 (25 @ 04:45)  SpO2: 97% (25 @ 04:45)  Wt(kg): --      --------------------------------------------------------  IN:    IV PiggyBack: 100 mL    Lactated Ringers: 500 mL  Total IN: 600 mL    OUT:    Oral Fluid: 0 mL    Voided (mL): 0 mL  Total OUT: 0 mL    Total NET: 600 mL        GEN: NAD, normocephalic  CVS: Normal rate, HD stable  CHEST: No signs of respiratory distress, breathing comfortably, no accessory muscle usage  ABD: soft , nontender, nondistended, bowel sounds present  EXTR: no cyanosis, no clubbing, no edema  NEURO: Awake and alert, conversant  SKIN:  warm;  non icteric      Labs/Imaging reviewed.                        14.0   7.74  )-----------( 338      ( 14 Mar 2025 06:36 )             42.5     Last Hb:Hemoglobin: 14.0 g/dL (25 @ 06:36)  Hemoglobin: 13.2 g/dL (25 @ 21:43)               137   |  103   |  10                 Ca: 8.8    BMP:   ----------------------------< 90     M.2   (25 @ 06:36)             3.9    |  22    | 0.57               Ph: 3.1      LFT:     TPro: 6.6 / Alb: 3.7 / TBili: 3.4 / DBili: x / AST: 401 / ALT: 677 / AlkPhos: 238   (25 @ 06:36)    Creatinine: 0.57 mg/dL  Creatinine: 0.50 mg/dL      BUN/Cr: Blood Urea Nitrogen: 10 mg/dL (25 @ 06:36)  /Creatinine: 0.57 mg/dL (25 @ 06:36)    AST/ALTAspartate Aminotransferase (AST/SGOT): 401 U/L (25 @ 06:36)  /Alanine Aminotransferase (ALT/SGPT): 677 U/L (25 @ 06:36)    ALP Alkaline Phosphatase: 238 U/L (25 @ 06:36)    T/Dbili /  INR: INR: 0.91 ratio (25 @ 06:36)      EGD/[place]:      Imaging:  CT Abdomen and Pelvis w/ IV Cont:   ACC: [id number] EXAM:  CT ABDOMEN AND PELVIS IC   ORDERED BY:  PEGGY GRIMALDO     PROCEDURE DATE:  2025          INTERPRETATION:  CLINICAL INFORMATION: Epigastric pain    COMPARISON: None.    CONTRAST/COMPLICATIONS:  IV Contrast: Omnipaque 350  90 cc administered   10 cc discarded  Oral Contrast: NONE  .    PROCEDURE:  CT of the Abdomen and Pelvis was performed.  Sagittal and coronal reformats were performed.    FINDINGS:  LOWER CHEST: Partially imaged bilateral breast implants.    LIVER: Within normal limits.  BILE DUCTS: Upper normal range of common bile duct. Suboptimal evaluation   of francisca hepatis.  GALLBLADDER: Cholecystectomy. Tiny collection at the cholecystectomy site   (301-39).  SPLEEN: Within normal limits.  PANCREAS: Within normal limits.  ADRENALS: Within normal limits.  KIDNEYS/URETERS: Within normal limits.    BLADDER: Within normal limits.  REPRODUCTIVE ORGANS: Fibroid uterus with multiple pedunculated fibroids,   for example a large heterogeneous fibroid measuring approximately 7.2 x   6.0 cm.    BOWEL: No bowel obstruction. Appendix is normal.  PERITONEUM/RETROPERITONEUM: Within normal limits.  VESSELS: Within normal limits.  LYMPH NODES: No lymphadenopathy.  ABDOMINAL WALL: Within normal limits.  BONES: Well-circumscribed defect in the sacrum near the right S2 neural   foramen (301-77), likely a perineural cyst.    IMPRESSION:  Fibroid uterus inclusive of multiple pedunculated fibroids, the largest   measuring 7.2 cm.  Tiny collection at the cholecystectomy site. No evidence of abscess.  Otherwise no acute pathology in the abdomen or pelvis.        --- End of Report ---

## 2025-03-15 NOTE — CONSULT NOTE ADULT - ASSESSMENT
44-year-old with PMH CCY 2/27/2025 presenting with abdominal pain found to have elevations in liver chemistries    Impression  #elevated liver tests, mixed pattern  #s/p CCY 2/27/25  Patient with history of recent cholecystectomy presenting with acute onset right upper quadrant abdominal pain for the past 1 week associated with nausea and poor p.o. intake.  Hemodynamically stable. Labs: TB 5.0 from 3.4, mainly direct;  stable; AST//459 from 401/677; INR 0.9. CT with possible tiny GB fossa collection; MRCP negative for collection and choledocholithiasis; HIDA without signal in the biliary tree or small bowel. Was taking Tylenol (500mg BID)/ibuprofen (600 BID) at home. No OTC meds, herbal supplements. No hx of liver disease. Social alcohol use, once per week, usually 2 mixed drinks. No FHx liver disease.   Unclear etiology regarding elevated liver tests;   Given negative MRCP, unlikely related to bile leak. Would start broad workup as below.    Recommendations  - viral etiologies: HAV IgG, HBVcAb, HBVsAb (quantitative), HBVsAg, HBV PCR, HCV IgG, HCV PCR, HEV PCR  - HSV 1/2 PCRs, EBV PCR, CMV IgM, CMV IgG, CMV PCR, Parvovirus B19 serologies, parvovirus B19 PCR  - send following for autoimmune etiologies: GIRMA, anti-smooth muscle antibody, anti-mitochondrial antibody, anti-liver-kidney microsomyl antibody, immunoglobulin panel  - send ceruloplasm, ferritin, iron panel    Note and recommendations are incomplete until reviewed and attested by attending.    Lakshmi Liu MD  GI/Hepatology Fellow, PGY5  [place] Pager [phone] or [facility] Pager [phone]  Teams preferred (7AM to 5PM); after 5PM, call GI fellow on call    On Weekends/Holidays (All Day) and Weekdays after 5PM to 8AM  For non-urgent consults, please email [email or web address] and [email or web address]  For urgent consults, please contact on call GI team. See Amion schedule ([facility]), [employer] paging system ([facility]), or call hospital  ([facility]) 44-year-old with PMH CCY 2/27/2025 presenting with abdominal pain found to have elevations in liver chemistries    Impression  #elevated liver tests, mixed pattern  #s/p CCY 2/27/25  Patient with history of recent cholecystectomy presenting with acute onset right upper quadrant abdominal pain for the past 1 week associated with nausea and poor p.o. intake.  Hemodynamically stable. Labs: TB 5.0 from 3.4, mainly direct;  stable; AST//459 from 401/677; INR 0.9. CT with possible tiny GB fossa collection; MRCP negative for collection and choledocholithiasis; HIDA without signal in the biliary tree or small bowel. Was taking Tylenol (500mg BID)/ibuprofen (600 BID) at home. Briefly on Zosyn (2/26-2/27) during admission for cholecystitis and not discharged on abx. No OTC meds, herbal supplements. No hx of liver disease. Social alcohol use, once per week, usually 2 mixed drinks. No FHx liver disease.   Unclear etiology regarding elevated liver tests;   Given negative MRCP, unlikely related to bile leak. Would start broad workup as below.    Recommendations  - viral etiologies: HAV IgG, HBVcAb, HBVsAb (quantitative), HBVsAg, HBV PCR, HCV IgG, HCV PCR, HEV PCR  - HSV 1/2 PCRs, EBV PCR, CMV IgM, CMV IgG, CMV PCR, Parvovirus B19 serologies, parvovirus B19 PCR  - send following for autoimmune etiologies: GIRMA, anti-smooth muscle antibody, anti-mitochondrial antibody, anti-liver-kidney microsomyl antibody, immunoglobulin panel  - send ceruloplasm, ferritin, iron panel    Note and recommendations are incomplete until reviewed and attested by attending.    Lakshmi Liu MD  GI/Hepatology Fellow, PGY5  [employer] Pager [phone] or [facility] Pager [phone]  Teams preferred (7AM to 5PM); after 5PM, call GI fellow on call    On Weekends/Holidays (All Day) and Weekdays after 5PM to 8AM  For non-urgent consults, please email [email or web address] and [email or web address]  For urgent consults, please contact on call GI team. See Amion schedule ([facility]), [facility] paging system ([facility]), or call hospital  ([facility]) 44-year-old with PMH CCY 2/27/2025 presenting with abdominal pain found to have elevations in liver chemistries    Impression  #elevated liver tests, mixed pattern  #s/p CCY 2/27/25  Patient with history of recent cholecystectomy presenting with acute onset right upper quadrant abdominal pain for the past 1 week associated with nausea and poor p.o. intake.  Hemodynamically stable. Labs: TB 5.0 from 3.4, mainly direct;  stable; AST//459 from 401/677; INR 0.9. CT with possible tiny GB fossa collection; MRCP negative for collection and choledocholithiasis; HIDA without signal in the biliary tree or small bowel. Was taking Tylenol (500mg BID)/ibuprofen (600 BID) at home. Briefly on Zosyn (2/26-2/27) during admission for cholecystitis and not discharged on abx. No OTC meds, herbal supplements. No hx of liver disease. Social alcohol use, once per week, usually 2 mixed drinks. No FHx liver disease.   Unclear etiology regarding elevated liver tests; possibly 2/2 recent zosyn exposure (rare cause of cholestatic hepatitis that can occur 1-3 weeks after initial exposure). Given negative MRCP, unlikely related to bile leak. Would start broad workup as below.    Recommendations  - viral etiologies: HAV IgG, HBVcAb, HBVsAb (quantitative), HBVsAg, HBV PCR, HCV IgG, HCV PCR, HEV PCR  - HSV 1/2 PCRs, EBV PCR, CMV IgM, CMV IgG, CMV PCR, Parvovirus B19 serologies, parvovirus B19 PCR  - send following for autoimmune etiologies: GIRMA, anti-smooth muscle antibody, anti-mitochondrial antibody, anti-liver-kidney microsomyl antibody, immunoglobulin panel  - send ceruloplasm, ferritin, iron panel    Note and recommendations are incomplete until reviewed and attested by attending.    Lakshmi Liu MD  GI/Hepatology Fellow, PGY5  [employer] Pager [phone] or [facility] Pager [phone]  Teams preferred (7AM to 5PM); after 5PM, call GI fellow on call    On Weekends/Holidays (All Day) and Weekdays after 5PM to 8AM  For non-urgent consults, please email [email or web address] and [email or web address]  For urgent consults, please contact on call GI team. See Amion schedule ([facility]), [facility] paging system ([facility] or call hospital  ([facility])

## 2025-03-15 NOTE — PROGRESS NOTE ADULT - SUBJECTIVE AND OBJECTIVE BOX
SURGERY DAILY PROGRESS NOTE    SUBJECTIVE: Patient seen and evaluated on AM rounds.     -----------------------------------------------------------------------------------------------------------------------------------------------------------------------------------------------------------  OBJECTIVE:  Vital Signs Last 24 Hrs  T(C): 36.5 (15 Mar 2025 00:26), Max: 36.8 (14 Mar 2025 16:39)  T(F): 97.7 (15 Mar 2025 00:26), Max: 98.3 (14 Mar 2025 16:39)  HR: 66 (15 Mar 2025 00:26) (62 - 76)  BP: 117/78 (15 Mar 2025 00:26) (109/69 - 146/93)  BP(mean): --  RR: 18 (15 Mar 2025 00:26) (18 - 18)  SpO2: 98% (15 Mar 2025 00:26) (97% - 99%)    Parameters below as of 15 Mar 2025 00:26  Patient On (Oxygen Delivery Method): room air      I&O's Detail    13 Mar 2025 07:01  -  14 Mar 2025 07:00  --------------------------------------------------------  IN:    IV PiggyBack: 100 mL    Lactated Ringers: 500 mL  Total IN: 600 mL    OUT:    Oral Fluid: 0 mL    Voided (mL): 0 mL  Total OUT: 0 mL    Total NET: 600 mL      14 Mar 2025 07:01  -  15 Mar 2025 03:51  --------------------------------------------------------  IN:  Total IN: 0 mL    OUT:    Oral Fluid: 0 mL    Voided (mL): 700 mL  Total OUT: 700 mL    Total NET: -700 mL        Daily     Daily   MEDICATIONS  (STANDING):  influenza   Vaccine 0.5 milliLiter(s) IntraMuscular once  lactated ringers. 1000 milliLiter(s) (100 mL/Hr) IV Continuous <Continuous>    MEDICATIONS  (PRN):  HYDROmorphone  Injectable 0.2 milliGRAM(s) IV Push every 4 hours PRN Moderate Pain (4 - 6)  HYDROmorphone  Injectable 0.5 milliGRAM(s) IV Push every 4 hours PRN Severe Pain (7 - 10)  ondansetron Injectable 4 milliGRAM(s) IV Push every 6 hours PRN Nausea and/or Vomiting      LABS:                        14.0   7.74  )-----------( 338      ( 14 Mar 2025 06:36 )             42.5     03-14    137  |  103  |  10  ----------------------------<  90  3.9   |  22  |  0.57    Ca    8.8      14 Mar 2025 06:36  Phos  3.1     03-14  Mg     2.2     03-14    TPro  6.6  /  Alb  3.7  /  TBili  3.4[H]  /  DBili  x   /  AST  401[H]  /  ALT  677[H]  /  AlkPhos  238[H]  03-14    PT/INR - ( 14 Mar 2025 06:36 )   PT: 10.5 sec;   INR: 0.91 ratio         PTT - ( 14 Mar 2025 06:36 )  PTT:29.9 sec  Urinalysis Basic - ( 14 Mar 2025 06:36 )    Color: x / Appearance: x / SG: x / pH: x  Gluc: 90 mg/dL / Ketone: x  / Bili: x / Urobili: x   Blood: x / Protein: x / Nitrite: x   Leuk Esterase: x / RBC: x / WBC x   Sq Epi: x / Non Sq Epi: x / Bacteria: x        PHYSICAL EXAM:  GENERAL: NAD, well-developed  HEAD:  Atraumatic, Normocephalic  EYES: EOMI, , conjunctiva and sclera clear  NECK: Supple, No JVD  CHEST/LUNG: No distress, speaks in full sentences, on RA  HEART: WWP, mentating  ABDOMEN: Soft, mild tenderness to palpation in the RUQ  PSYCH: AAOx3  NEUROLOGY: non-focal  SKIN: No rashes or lesions

## 2025-03-16 VITALS
HEART RATE: 75 BPM | OXYGEN SATURATION: 96 % | RESPIRATION RATE: 18 BRPM | DIASTOLIC BLOOD PRESSURE: 84 MMHG | SYSTOLIC BLOOD PRESSURE: 122 MMHG | TEMPERATURE: 98 F

## 2025-03-16 LAB
ALBUMIN SERPL ELPH-MCNC: 3.4 G/DL — SIGNIFICANT CHANGE UP (ref 3.3–5)
ALP SERPL-CCNC: 272 U/L — HIGH (ref 40–120)
ALT FLD-CCNC: 363 U/L — HIGH (ref 10–45)
ANION GAP SERPL CALC-SCNC: 13 MMOL/L — SIGNIFICANT CHANGE UP (ref 5–17)
AST SERPL-CCNC: 97 U/L — HIGH (ref 10–40)
BILIRUB SERPL-MCNC: 1.9 MG/DL — HIGH (ref 0.2–1.2)
BUN SERPL-MCNC: 10 MG/DL — SIGNIFICANT CHANGE UP (ref 7–23)
CALCIUM SERPL-MCNC: 9.1 MG/DL — SIGNIFICANT CHANGE UP (ref 8.4–10.5)
CERULOPLASMIN SERPL-MCNC: 28 MG/DL — SIGNIFICANT CHANGE UP (ref 16–45)
CHLORIDE SERPL-SCNC: 104 MMOL/L — SIGNIFICANT CHANGE UP (ref 96–108)
CO2 SERPL-SCNC: 19 MMOL/L — LOW (ref 22–31)
CREAT SERPL-MCNC: 0.58 MG/DL — SIGNIFICANT CHANGE UP (ref 0.5–1.3)
EGFR: 114 ML/MIN/1.73M2 — SIGNIFICANT CHANGE UP
EGFR: 114 ML/MIN/1.73M2 — SIGNIFICANT CHANGE UP
FERRITIN SERPL-MCNC: 185 NG/ML — HIGH (ref 15–150)
GLUCOSE SERPL-MCNC: 109 MG/DL — HIGH (ref 70–99)
HBV SURFACE AG SER-ACNC: SIGNIFICANT CHANGE UP
HCT VFR BLD CALC: 36.3 % — SIGNIFICANT CHANGE UP (ref 34.5–45)
HGB BLD-MCNC: 12.3 G/DL — SIGNIFICANT CHANGE UP (ref 11.5–15.5)
HSV 1/2 SOURCE: SIGNIFICANT CHANGE UP
HSV1 DNA BLD QL NAA+PROBE: SIGNIFICANT CHANGE UP
HSV2 DNA BLD QL NAA+PROBE: SIGNIFICANT CHANGE UP
IRON SATN MFR SERPL: 14 % — SIGNIFICANT CHANGE UP (ref 14–50)
IRON SATN MFR SERPL: 39 UG/DL — SIGNIFICANT CHANGE UP (ref 30–160)
MAGNESIUM SERPL-MCNC: 2 MG/DL — SIGNIFICANT CHANGE UP (ref 1.6–2.6)
MCHC RBC-ENTMCNC: 31.2 PG — SIGNIFICANT CHANGE UP (ref 27–34)
MCHC RBC-ENTMCNC: 33.9 G/DL — SIGNIFICANT CHANGE UP (ref 32–36)
MCV RBC AUTO: 92.1 FL — SIGNIFICANT CHANGE UP (ref 80–100)
NRBC BLD AUTO-RTO: 0 /100 WBCS — SIGNIFICANT CHANGE UP (ref 0–0)
PHOSPHATE SERPL-MCNC: 2.4 MG/DL — LOW (ref 2.5–4.5)
PLATELET # BLD AUTO: 351 K/UL — SIGNIFICANT CHANGE UP (ref 150–400)
POTASSIUM SERPL-MCNC: 3.9 MMOL/L — SIGNIFICANT CHANGE UP (ref 3.5–5.3)
POTASSIUM SERPL-SCNC: 3.9 MMOL/L — SIGNIFICANT CHANGE UP (ref 3.5–5.3)
PROT SERPL-MCNC: 6.2 G/DL — SIGNIFICANT CHANGE UP (ref 6–8.3)
RBC # BLD: 3.94 M/UL — SIGNIFICANT CHANGE UP (ref 3.8–5.2)
RBC # FLD: 13.1 % — SIGNIFICANT CHANGE UP (ref 10.3–14.5)
TIBC SERPL-MCNC: 270 UG/DL — SIGNIFICANT CHANGE UP (ref 220–430)
TRANSFERRIN SERPL-MCNC: 232 MG/DL — SIGNIFICANT CHANGE UP (ref 200–360)
UIBC SERPL-MCNC: 231 UG/DL — SIGNIFICANT CHANGE UP (ref 110–370)
WBC # BLD: 6.51 K/UL — SIGNIFICANT CHANGE UP (ref 3.8–10.5)
WBC # FLD AUTO: 6.51 K/UL — SIGNIFICANT CHANGE UP (ref 3.8–10.5)

## 2025-03-16 PROCEDURE — 87340 HEPATITIS B SURFACE AG IA: CPT

## 2025-03-16 PROCEDURE — 78226 HEPATOBILIARY SYSTEM IMAGING: CPT | Mod: MC

## 2025-03-16 PROCEDURE — 99285 EMERGENCY DEPT VISIT HI MDM: CPT

## 2025-03-16 PROCEDURE — 87086 URINE CULTURE/COLONY COUNT: CPT

## 2025-03-16 PROCEDURE — 36415 COLL VENOUS BLD VENIPUNCTURE: CPT

## 2025-03-16 PROCEDURE — 86645 CMV ANTIBODY IGM: CPT

## 2025-03-16 PROCEDURE — A9537: CPT

## 2025-03-16 PROCEDURE — 86900 BLOOD TYPING SEROLOGIC ABO: CPT

## 2025-03-16 PROCEDURE — 86038 ANTINUCLEAR ANTIBODIES: CPT

## 2025-03-16 PROCEDURE — 99232 SBSQ HOSP IP/OBS MODERATE 35: CPT | Mod: GC

## 2025-03-16 PROCEDURE — 84466 ASSAY OF TRANSFERRIN: CPT

## 2025-03-16 PROCEDURE — 86747 PARVOVIRUS ANTIBODY: CPT

## 2025-03-16 PROCEDURE — 83540 ASSAY OF IRON: CPT

## 2025-03-16 PROCEDURE — 86255 FLUORESCENT ANTIBODY SCREEN: CPT

## 2025-03-16 PROCEDURE — 86850 RBC ANTIBODY SCREEN: CPT

## 2025-03-16 PROCEDURE — 87517 HEPATITIS B DNA QUANT: CPT

## 2025-03-16 PROCEDURE — 86644 CMV ANTIBODY: CPT

## 2025-03-16 PROCEDURE — 81001 URINALYSIS AUTO W/SCOPE: CPT

## 2025-03-16 PROCEDURE — 96374 THER/PROPH/DIAG INJ IV PUSH: CPT

## 2025-03-16 PROCEDURE — 84702 CHORIONIC GONADOTROPIN TEST: CPT

## 2025-03-16 PROCEDURE — 74183 MRI ABD W/O CNTR FLWD CNTR: CPT | Mod: MC

## 2025-03-16 PROCEDURE — 85027 COMPLETE CBC AUTOMATED: CPT

## 2025-03-16 PROCEDURE — 86901 BLOOD TYPING SEROLOGIC RH(D): CPT

## 2025-03-16 PROCEDURE — 83690 ASSAY OF LIPASE: CPT

## 2025-03-16 PROCEDURE — 83550 IRON BINDING TEST: CPT

## 2025-03-16 PROCEDURE — 85730 THROMBOPLASTIN TIME PARTIAL: CPT

## 2025-03-16 PROCEDURE — 86706 HEP B SURFACE ANTIBODY: CPT

## 2025-03-16 PROCEDURE — A9585: CPT

## 2025-03-16 PROCEDURE — 87521 HEPATITIS C PROBE&RVRS TRNSC: CPT

## 2025-03-16 PROCEDURE — 86381 MITOCHONDRIAL ANTIBODY EACH: CPT

## 2025-03-16 PROCEDURE — 86704 HEP B CORE ANTIBODY TOTAL: CPT

## 2025-03-16 PROCEDURE — 82784 ASSAY IGA/IGD/IGG/IGM EACH: CPT

## 2025-03-16 PROCEDURE — 87529 HSV DNA AMP PROBE: CPT

## 2025-03-16 PROCEDURE — 82728 ASSAY OF FERRITIN: CPT

## 2025-03-16 PROCEDURE — 80053 COMPREHEN METABOLIC PANEL: CPT

## 2025-03-16 PROCEDURE — 86376 MICROSOMAL ANTIBODY EACH: CPT

## 2025-03-16 PROCEDURE — 84100 ASSAY OF PHOSPHORUS: CPT

## 2025-03-16 PROCEDURE — 83735 ASSAY OF MAGNESIUM: CPT

## 2025-03-16 PROCEDURE — 74177 CT ABD & PELVIS W/CONTRAST: CPT | Mod: MC

## 2025-03-16 PROCEDURE — 82787 IGG 1 2 3 OR 4 EACH: CPT

## 2025-03-16 PROCEDURE — 82390 ASSAY OF CERULOPLASMIN: CPT

## 2025-03-16 PROCEDURE — 85025 COMPLETE CBC W/AUTO DIFF WBC: CPT

## 2025-03-16 PROCEDURE — 85610 PROTHROMBIN TIME: CPT

## 2025-03-16 PROCEDURE — 86708 HEPATITIS A ANTIBODY: CPT

## 2025-03-16 PROCEDURE — 87799 DETECT AGENT NOS DNA QUANT: CPT

## 2025-03-16 PROCEDURE — 82248 BILIRUBIN DIRECT: CPT

## 2025-03-16 RX ORDER — SODIUM PHOSPHATE,DIBASIC DIHYD
15 POWDER (GRAM) MISCELLANEOUS ONCE
Refills: 0 | Status: COMPLETED | OUTPATIENT
Start: 2025-03-16 | End: 2025-03-16

## 2025-03-16 RX ORDER — ENOXAPARIN SODIUM 100 MG/ML
40 INJECTION SUBCUTANEOUS EVERY 24 HOURS
Refills: 0 | Status: DISCONTINUED | OUTPATIENT
Start: 2025-03-16 | End: 2025-03-16

## 2025-03-16 RX ORDER — OXYCODONE HYDROCHLORIDE 30 MG/1
1 TABLET ORAL
Qty: 4 | Refills: 0
Start: 2025-03-16 | End: 2025-03-16

## 2025-03-16 RX ADMIN — Medication 63.75 MILLIMOLE(S): at 09:21

## 2025-03-16 NOTE — CHART NOTE - NSCHARTNOTEFT_GEN_A_CORE
Patient evaluated at bedside.  Reports feeling better; abdominal pain is completely resolved at this point.  Labs with total bilirubin 1.9 from 5.0 yesterday;  from 238; AST 97 from 178;  from 459.  Suspect etiology of elevated liver tests in setting of passed stone versus cholestatic injury from prior administration of Zosyn.  Patient is okay to be discharged from our standpoint with outpatient follow-up with hepatology team to follow-up on repeat CMP as well as other serological workup that was sent this admission. We will sign off at this time.  Please call back with questions  Patient seen and discussed with Dr. Monterroso.    Lakshmi Liu MD  GI/Hepatology Fellow, PGY5  Long Range Pager [phone]  Teams preferred (7AM to 5PM); after 5PM Patient evaluated at bedside.  Reports feeling better; abdominal pain is completely resolved at this point.  Labs with total bilirubin 1.9 from 5.0 yesterday;  from 238; AST 97 from 178;  from 459.  Suspect etiology of elevated liver tests in setting of passed stone versus cholestatic injury from prior administration of Zosyn.  Patient is okay to be discharged from our standpoint with outpatient follow-up with hepatology team to follow-up on repeat CMP as well as other serological workup that was sent this admission. We will sign off at this time.  Please call back with questions  Patient seen and discussed with Dr. Monterroso.  Hepatology Clinic outpatient follow up number: [phone] ([facility]) or or [phone] ([facility] at [address] [place] [address]).    Lakshmi Liu MD  GI/Hepatology Fellow, PGY5  Long Range Pager [phone]  Teams preferred (7AM to 5PM); after 5PM

## 2025-03-16 NOTE — PROGRESS NOTE ADULT - SUBJECTIVE AND OBJECTIVE BOX
SURGERY DAILY PROGRESS NOTE    Overnight Events: No acute events overnight    SUBJECTIVE: Patient seen and evaluated on AM rounds. -----------------------------------------------------------------------------------------------------------------------------------------------------------------------------------------------------------  OBJECTIVE:  Vital Signs Last 24 Hrs  T(C): 36.7 (15 Mar 2025 23:55), Max: 37.1 (15 Mar 2025 05:17)  T(F): 98 (15 Mar 2025 23:55), Max: 98.8 (15 Mar 2025 05:17)  HR: 83 (15 Mar 2025 23:55) (62 - 83)  BP: 114/80 (15 Mar 2025 23:55) (108/68 - 127/86)  BP(mean): --  RR: 18 (15 Mar 2025 23:55) (18 - 18)  SpO2: 98% (15 Mar 2025 23:55) (97% - 98%)    Parameters below as of 15 Mar 2025 23:55  Patient On (Oxygen Delivery Method): room air      I&O's Detail    14 Mar 2025 07:01  -  15 Mar 2025 07:00  --------------------------------------------------------  IN:    IV PiggyBack: 200 mL    Lactated Ringers: 1100 mL  Total IN: 1300 mL    OUT:    Oral Fluid: 0 mL    Voided (mL): 700 mL  Total OUT: 700 mL    Total NET: 600 mL      15 Mar 2025 07:01  -  16 Mar 2025 03:39  --------------------------------------------------------  IN:    Lactated Ringers: 1200 mL  Total IN: 1200 mL    OUT:    Oral Fluid: 0 mL    Voided (mL): 1150 mL  Total OUT: 1150 mL    Total NET: 50 mL        Daily     Daily   MEDICATIONS  (STANDING):  enoxaparin Injectable 40 milliGRAM(s) SubCutaneous every 24 hours  influenza   Vaccine 0.5 milliLiter(s) IntraMuscular once    MEDICATIONS  (PRN):  HYDROmorphone  Injectable 0.2 milliGRAM(s) IV Push every 4 hours PRN Moderate Pain (4 - 6)  HYDROmorphone  Injectable 0.5 milliGRAM(s) IV Push every 4 hours PRN Severe Pain (7 - 10)  ondansetron Injectable 4 milliGRAM(s) IV Push every 6 hours PRN Nausea and/or Vomiting      PHYSICAL EXAM:  General: NAD, resting comfortably in bed  HEENT: Normocephalic atraumatic  Respiratory: Nonlabored respirations  Abdomen: soft, nontender, nondistended. No rebound or guarding. No obvious masses. Midline incisions with staples C/D/I. Wound incisions C/D/I. NGT with output. TERA drain in w/ serosanguineous output. Ostomy pink with solid stool in ostomy bag. Gravity bag with minimal bilious drainage. Midline wound vac holding suction.   Neuro: awake, alert and answering questions appropriately     LABS:                        12.3   6.34  )-----------( 304      ( 15 Mar 2025 07:10 )             37.2     03-15    138  |  103  |  10  ----------------------------<  73  3.6   |  20[L]  |  0.58    Ca    8.6      15 Mar 2025 07:11  Phos  3.6     03-15  Mg     2.0     03-15    TPro  5.8[L]  /  Alb  3.2[L]  /  TBili  5.0[H]  /  DBili  3.7[H]  /  AST  178[H]  /  ALT  459[H]  /  AlkPhos  238[H]  03-15    PT/INR - ( 14 Mar 2025 06:36 )   PT: 10.5 sec;   INR: 0.91 ratio         PTT - ( 14 Mar 2025 06:36 )  PTT:29.9 sec  Urinalysis Basic - ( 15 Mar 2025 07:11 )    Color: x / Appearance: x / SG: x / pH: x  Gluc: 73 mg/dL / Ketone: x  / Bili: x / Urobili: x   Blood: x / Protein: x / Nitrite: x   Leuk Esterase: x / RBC: x / WBC x   Sq Epi: x / Non Sq Epi: x / Bacteria: x                RADIOLOGY:   SURGERY DAILY PROGRESS NOTE    Overnight Events: No acute events overnight    SUBJECTIVE: Patient seen and evaluated on AM rounds. -----------------------------------------------------------------------------------------------------------------------------------------------------------------------------------------------------------  OBJECTIVE:  Vital Signs Last 24 Hrs  T(C): 36.7 (15 Mar 2025 23:55), Max: 37.1 (15 Mar 2025 05:17)  T(F): 98 (15 Mar 2025 23:55), Max: 98.8 (15 Mar 2025 05:17)  HR: 83 (15 Mar 2025 23:55) (62 - 83)  BP: 114/80 (15 Mar 2025 23:55) (108/68 - 127/86)  BP(mean): --  RR: 18 (15 Mar 2025 23:55) (18 - 18)  SpO2: 98% (15 Mar 2025 23:55) (97% - 98%)    Parameters below as of 15 Mar 2025 23:55  Patient On (Oxygen Delivery Method): room air      I&O's Detail    14 Mar 2025 07:01  -  15 Mar 2025 07:00  --------------------------------------------------------  IN:    IV PiggyBack: 200 mL    Lactated Ringers: 1100 mL  Total IN: 1300 mL    OUT:    Oral Fluid: 0 mL    Voided (mL): 700 mL  Total OUT: 700 mL    Total NET: 600 mL      15 Mar 2025 07:01  -  16 Mar 2025 03:39  --------------------------------------------------------  IN:    Lactated Ringers: 1200 mL  Total IN: 1200 mL    OUT:    Oral Fluid: 0 mL    Voided (mL): 1150 mL  Total OUT: 1150 mL    Total NET: 50 mL        Daily     Daily   MEDICATIONS  (STANDING):  enoxaparin Injectable 40 milliGRAM(s) SubCutaneous every 24 hours  influenza   Vaccine 0.5 milliLiter(s) IntraMuscular once    MEDICATIONS  (PRN):  HYDROmorphone  Injectable 0.2 milliGRAM(s) IV Push every 4 hours PRN Moderate Pain (4 - 6)  HYDROmorphone  Injectable 0.5 milliGRAM(s) IV Push every 4 hours PRN Severe Pain (7 - 10)  ondansetron Injectable 4 milliGRAM(s) IV Push every 6 hours PRN Nausea and/or Vomiting      PHYSICAL EXAM:  General: NAD, resting comfortably in bed  HEENT: Normocephalic atraumatic  Respiratory: Nonlabored respirations  Abdomen: soft, nondistended, mildly tender to palpation in RUQ  Neuro: awake, alert and answering questions appropriately     LABS:                        12.3   6.34  )-----------( 304      ( 15 Mar 2025 07:10 )             37.2     03-15    138  |  103  |  10  ----------------------------<  73  3.6   |  20[L]  |  0.58    Ca    8.6      15 Mar 2025 07:11  Phos  3.6     03-15  Mg     2.0     03-15    TPro  5.8[L]  /  Alb  3.2[L]  /  TBili  5.0[H]  /  DBili  3.7[H]  /  AST  178[H]  /  ALT  459[H]  /  AlkPhos  238[H]  03-15    PT/INR - ( 14 Mar 2025 06:36 )   PT: 10.5 sec;   INR: 0.91 ratio         PTT - ( 14 Mar 2025 06:36 )  PTT:29.9 sec  Urinalysis Basic - ( 15 Mar 2025 07:11 )    Color: x / Appearance: x / SG: x / pH: x  Gluc: 73 mg/dL / Ketone: x  / Bili: x / Urobili: x   Blood: x / Protein: x / Nitrite: x   Leuk Esterase: x / RBC: x / WBC x   Sq Epi: x / Non Sq Epi: x / Bacteria: x                RADIOLOGY:

## 2025-03-16 NOTE — DISCHARGE NOTE PROVIDER - NSDCCPCAREPLAN_GEN_ALL_CORE_FT
PRINCIPAL DISCHARGE DIAGNOSIS  Diagnosis: Elevated LFTs  Assessment and Plan of Treatment: Hepatology Clinic outpatient follow up number: [phone] ([facility] [place]) or or [phone] ([facility] at [address] [place] [address]).  Please return to ER if you have any nausea/vomiting, dizziness, change in bowel habits.

## 2025-03-16 NOTE — DISCHARGE NOTE NURSING/CASE MANAGEMENT/SOCIAL WORK - NSDCFUADDAPPT_GEN_ALL_CORE_FT
Hepatology Clinic outpatient follow up number: [phone] ([facility]) or or [phone] ([facility] at [address] [place] [address]).

## 2025-03-16 NOTE — DISCHARGE NOTE NURSING/CASE MANAGEMENT/SOCIAL WORK - NSDCPEFALRISK_GEN_ALL_CORE
For information on Fall & Injury Prevention, visit: [email or web address] OR  [email or web address] OR  https://www.cdc.gov/steadi/patient.html

## 2025-03-16 NOTE — DISCHARGE NOTE PROVIDER - HOSPITAL COURSE
44F no PMHx who presented on Feb 26 with 3 days of RUQ abdominal pain associated whit food and began with eating a fatty meal, patient underwent an uncomplicated lap javier on 02/27/25. On Saturday patient started having epigastric pain along with nausea/vomiting.  And takes meals has epigastric fullness and pain. Pt also endorses dysuria, and jaundice. Pt denies CP, SOB and back pain. Patient AFVSS. Labs significant for Tbili  3.5, Alk Phos 220, , . CT scan showing a Tiny collection at the cholecystectomy site. No evidence of abscess. Physical exam significant for mild abdominal tenderness. MRCP negative and hepatology consulted for further w/u.     As per hepatology consult Suspect etiology of elevated liver tests in setting of passed stone versus cholestatic injury from prior administration of Zosyn.  Patient is okay to be discharged from our standpoint with outpatient follow-up with hepatology team to follow-up on repeat CMP as well as other serological workup that was sent this admission.

## 2025-03-16 NOTE — DISCHARGE NOTE PROVIDER - NSDCFUADDAPPT_GEN_ALL_CORE_FT
Hepatology Clinic outpatient follow up number: [phone] ([facility] [place]/[facility]) or or [phone] ([facility] at [address] [place] [address]).

## 2025-03-16 NOTE — DISCHARGE NOTE PROVIDER - NSDCFUSCHEDAPPT_GEN_ALL_CORE_FT
[facility] Physician [facility]  [facility] [address] [place] [address]   Scheduled Appointment: 03/17/2025

## 2025-03-16 NOTE — DISCHARGE NOTE PROVIDER - CARE PROVIDER_API CALL
David Feliciano  Surgery  [address] [place] [address], [facility] [address]  [place] [address]  Phone: [phone]  Fax: [phone]  Follow Up Time: 1-3 days

## 2025-03-16 NOTE — DISCHARGE NOTE NURSING/CASE MANAGEMENT/SOCIAL WORK - PATIENT PORTAL LINK FT
You can access the FollowMyHealth Patient Portal offered by [facility] by registering at the following website: [email or web address]. By joining [facility]’s FollowMyHealth portal, you will also be able to view your health information using other applications (apps) compatible with our system.

## 2025-03-16 NOTE — DISCHARGE NOTE NURSING/CASE MANAGEMENT/SOCIAL WORK - FINANCIAL ASSISTANCE
[facility] provides services at a reduced cost to those who are determined to be eligible through [facility]’s financial assistance program. Information regarding [facility]’s financial assistance program can be found by going to [email or web address] or by calling [phone].

## 2025-03-16 NOTE — PROGRESS NOTE ADULT - ASSESSMENT
44F no PMHx who presented on Feb 26 with 3 days of RUQ abdominal pain associated whit food and began with eating a fatty meal, patient underwent an uncomplicated lap javier on 02/27/25. On Saturday patient started having epigastric pain along with nausea/vomiting.  And takes meals has epigastric fullness and pain. Pt also endorses dysuria, and jaundice. Pt denies CP, SOB and back pain. Patient AFVSS. Labs significant for Tbili  3.5, Alk Phos 220, , . CT scan showing a Tiny collection at the cholecystectomy site. No evidence of abscess. Physical exam significant for mild abdominal tenderness. MRCP negative and hepatology consulted for further w/u.     Plan:  - Diet: regular   - Pain Control  - Trend LFTs  - DVT ppx: lovenox  - Holding antibiotics for being afebrile, no WBC  - 3/15 MRCP negative, f/u hepatology recommendations for other sources of elevated LFTs     ACS   44F no PMHx who presented on Feb 26 with 3 days of RUQ abdominal pain associated whit food and began with eating a fatty meal, patient underwent an uncomplicated lap javier on 02/27/25. On Saturday patient started having epigastric pain along with nausea/vomiting.  And takes meals has epigastric fullness and pain. Pt also endorses dysuria, and jaundice. Pt denies CP, SOB and back pain. Patient AFVSS. Labs significant for Tbili  3.5, Alk Phos 220, , . CT scan showing a Tiny collection at the cholecystectomy site. No evidence of abscess. Physical exam significant for mild abdominal tenderness. MRCP negative and hepatology consulted for further w/u.     Plan:  - Diet: regular   - Pain Control  - Trend LFTs - bilirubin downtrending today   - DVT ppx: lovenox  - Holding antibiotics for being afebrile, no WBC  - 3/15 MRCP negative, f/u hepatology recommendations for other sources of elevated LFTs     ACS

## 2025-03-17 ENCOUNTER — APPOINTMENT (OUTPATIENT)
Dept: TRAUMA SURGERY | Facility: CLINIC | Age: 45
End: 2025-03-17
Payer: MEDICAID

## 2025-03-17 LAB
B19V DNA FLD QL NAA+PROBE: SIGNIFICANT CHANGE UP IU/ML
CMV IGG FLD QL: 4.7 U/ML — HIGH
CMV IGG SERPL-IMP: POSITIVE
CMV IGM FLD-ACNC: <8 AU/ML — SIGNIFICANT CHANGE UP
CMV IGM SERPL QL: NEGATIVE — SIGNIFICANT CHANGE UP
EBV DNA SERPL NAA+PROBE-ACNC: SIGNIFICANT CHANGE UP IU/ML
EBVPCR LOG: SIGNIFICANT CHANGE UP LOG10IU/ML
HAV IGG SER QL IA: REACTIVE
HBV CORE AB SER-ACNC: SIGNIFICANT CHANGE UP
HBV SURFACE AB SER-ACNC: 82.3 MIU/ML — SIGNIFICANT CHANGE UP
IGG FLD-MCNC: 944 MG/DL — SIGNIFICANT CHANGE UP (ref 610–1660)
IGG1 SER-MCNC: 445 MG/DL — SIGNIFICANT CHANGE UP (ref 240–1118)
IGG3 SER-MCNC: 17.5 MG/DL — SIGNIFICANT CHANGE UP (ref 15–102)
IGG4 SER-MCNC: 18.7 MG/DL — SIGNIFICANT CHANGE UP (ref 1–123)
MITOCHONDRIA AB SER-ACNC: SIGNIFICANT CHANGE UP
SMOOTH MUSCLE AB SER-ACNC: SIGNIFICANT CHANGE UP

## 2025-03-17 PROCEDURE — 99024 POSTOP FOLLOW-UP VISIT: CPT

## 2025-03-18 LAB
B19V IGG SER-ACNC: 6.12 INDEX — HIGH (ref 0–0.9)
B19V IGG+IGM SER-IMP: POSITIVE
B19V IGG+IGM SER-IMP: SIGNIFICANT CHANGE UP
B19V IGM FLD-ACNC: 0.14 INDEX — SIGNIFICANT CHANGE UP (ref 0–0.9)
HBV DNA # SERPL NAA+PROBE: SIGNIFICANT CHANGE UP
HBV DNA SERPL NAA+PROBE-LOG#: SIGNIFICANT CHANGE UP LOGIU/ML
HCV RNA FLD QL NAA+PROBE: SIGNIFICANT CHANGE UP

## 2025-03-19 LAB — ANA TITR SER: NEGATIVE — SIGNIFICANT CHANGE UP

## 2025-03-20 LAB
ANNOTATION COMMENT IMP: ABNORMAL
HEV RNA SERPL NAA+PROBE-ACNC: SIGNIFICANT CHANGE UP IU/ML
HEV RNA SERPL NAA+PROBE-LOG IU: SIGNIFICANT CHANGE UP LOG IU/ML
LKM AB SER-ACNC: <20.1 UNITS — SIGNIFICANT CHANGE UP (ref 0–20)
SPECIMEN SOURCE: SIGNIFICANT CHANGE UP

## (undated) DEVICE — WARMING BLANKET UPPER ADULT

## (undated) DEVICE — SOL IRR POUR NS 0.9% 500ML

## (undated) DEVICE — DRAPE C ARM UNIVERSAL

## (undated) DEVICE — TROCAR COVIDIEN VERSAONE FIXATION CANNULA 5MM

## (undated) DEVICE — TROCAR COVIDIEN VERSAPORT BLADELESS OPTICAL 5MM STANDARD

## (undated) DEVICE — CATH IV SAFE INSYTE 14G X 1.75" (ORANGE)

## (undated) DEVICE — SYR LUER LOK 20CC

## (undated) DEVICE — DRAPE TOWEL BLUE 17" X 24"

## (undated) DEVICE — INSUFFLATION NDL ETHICON ENDOPATH VERESS 14G 120MM

## (undated) DEVICE — ENDOCATCH 10MM

## (undated) DEVICE — GLV 8.5 PROTEXIS (WHITE)

## (undated) DEVICE — MEDICATION LABELS W MARKER

## (undated) DEVICE — GOWN TRIMAX LG

## (undated) DEVICE — GLV 6.5 PROTEXIS (WHITE)

## (undated) DEVICE — BLADE SCALPEL SAFETYLOCK #10

## (undated) DEVICE — PACK ADVANCED LAPAROSCOPIC NS

## (undated) DEVICE — DRSG STERISTRIPS 0.5 X 4"

## (undated) DEVICE — POSITIONER FOAM EGG CRATE ULNAR 2PCS (PINK)

## (undated) DEVICE — GLV 7 PROTEXIS (WHITE)

## (undated) DEVICE — SUT POLYSORB 0 36" GU-46

## (undated) DEVICE — LIGASURE BLUNT TIP 5MM X 37CM

## (undated) DEVICE — TUBING IRRIGATION DAVOL SYSTEM X STREAM

## (undated) DEVICE — ELCTR LAPAROSCOPIC MONOPOLAR CORD

## (undated) DEVICE — SYR LUER LOK 30CC

## (undated) DEVICE — TROCAR COVIDIEN BLUNT TIP HASSAN 10MM

## (undated) DEVICE — VENODYNE/SCD SLEEVE CALF MEDIUM

## (undated) DEVICE — STOPCOCK 3 WAY W TUBE 35"

## (undated) DEVICE — TUBING STRYKEFLOW II SUCTION / IRRIGATOR

## (undated) DEVICE — SPECIMEN CONTAINER 100ML

## (undated) DEVICE — PREP CHLORAPREP HI-LITE ORANGE 26ML

## (undated) DEVICE — SOL IRR POUR H2O 250ML

## (undated) DEVICE — GLV 8 PROTEXIS (WHITE)

## (undated) DEVICE — D HELP - CLEARVIEW CLEARIFY SYSTEM

## (undated) DEVICE — TROCAR APPLIED MEDICAL KII BALLOON BLUNT TIP 12MM X 100MM

## (undated) DEVICE — TROCAR COVIDIEN VERSAONE BLADED FIXATION 11MM STANDARD

## (undated) DEVICE — TUBING TRUWAVE PRESSURE MALE/FEMALE 72"

## (undated) DEVICE — SUT PDS II 0 18" ENDOLOOP LIGATURE

## (undated) DEVICE — SHEARS COVIDIEN ENDO SHEAR 5MM X 31CM W UNIPOLAR CAUTERY

## (undated) DEVICE — GLV 7.5 PROTEXIS (WHITE)

## (undated) DEVICE — ELCTR BOVIE PENCIL SMOKE EVACUATION

## (undated) DEVICE — SHEARS COVIDIEN ROTICULATOR ENDO MINI 5MM WITH UNIPOLAR CAUTERY

## (undated) DEVICE — DRSG OPSITE 2.5 X 2"

## (undated) DEVICE — DISSECTOR ENDO PEANUT 5MM

## (undated) DEVICE — TUBING INSUFFLATION LAP FILTER 10FT

## (undated) DEVICE — SUT BIOSYN 4-0 18" P-12

## (undated) DEVICE — DRSG TELFA 3 X 8